# Patient Record
Sex: MALE | URBAN - METROPOLITAN AREA
[De-identification: names, ages, dates, MRNs, and addresses within clinical notes are randomized per-mention and may not be internally consistent; named-entity substitution may affect disease eponyms.]

---

## 2017-01-16 ENCOUNTER — APPOINTMENT (OUTPATIENT)
Age: 57
Setting detail: DERMATOLOGY
End: 2017-01-19

## 2017-01-16 DIAGNOSIS — L82.1 OTHER SEBORRHEIC KERATOSIS: ICD-10-CM

## 2017-01-16 DIAGNOSIS — D22 MELANOCYTIC NEVI: ICD-10-CM

## 2017-01-16 DIAGNOSIS — Z71.89 OTHER SPECIFIED COUNSELING: ICD-10-CM

## 2017-01-16 DIAGNOSIS — D18.0 HEMANGIOMA: ICD-10-CM

## 2017-01-16 DIAGNOSIS — D485 NEOPLASM OF UNCERTAIN BEHAVIOR OF SKIN: ICD-10-CM

## 2017-01-16 PROBLEM — D48.5 NEOPLASM OF UNCERTAIN BEHAVIOR OF SKIN: Status: ACTIVE | Noted: 2017-01-16

## 2017-01-16 PROBLEM — D18.01 HEMANGIOMA OF SKIN AND SUBCUTANEOUS TISSUE: Status: ACTIVE | Noted: 2017-01-16

## 2017-01-16 PROCEDURE — OTHER BIOPSY BY SHAVE METHOD: OTHER

## 2017-01-16 PROCEDURE — OTHER COUNSELING: OTHER

## 2017-01-16 PROCEDURE — OTHER SUNSCREEN RECOMMENDATIONS: OTHER

## 2017-01-16 PROCEDURE — 11100: CPT

## 2017-01-16 PROCEDURE — 99203 OFFICE O/P NEW LOW 30 MIN: CPT | Mod: 25

## 2017-01-16 ASSESSMENT — LOCATION SIMPLE DESCRIPTION DERM
LOCATION SIMPLE: RIGHT ANTERIOR NECK
LOCATION SIMPLE: LEFT SHOULDER
LOCATION SIMPLE: RIGHT LOWER BACK
LOCATION SIMPLE: RIGHT THIGH
LOCATION SIMPLE: RIGHT UPPER BACK
LOCATION SIMPLE: POSTERIOR NECK
LOCATION SIMPLE: ABDOMEN
LOCATION SIMPLE: ABDOMEN
LOCATION SIMPLE: RIGHT FOREARM
LOCATION SIMPLE: CHEST

## 2017-01-16 ASSESSMENT — LOCATION DETAILED DESCRIPTION DERM
LOCATION DETAILED: RIGHT SUPERIOR UPPER BACK
LOCATION DETAILED: RIGHT SUPERIOR MEDIAL MIDBACK
LOCATION DETAILED: PERIUMBILICAL SKIN
LOCATION DETAILED: RIGHT VENTRAL PROXIMAL FOREARM
LOCATION DETAILED: EPIGASTRIC SKIN
LOCATION DETAILED: LEFT POSTERIOR SHOULDER
LOCATION DETAILED: LEFT MEDIAL SUPERIOR CHEST
LOCATION DETAILED: RIGHT CLAVICULAR NECK
LOCATION DETAILED: LEFT MEDIAL TRAPEZIAL NECK
LOCATION DETAILED: RIGHT MID-UPPER BACK
LOCATION DETAILED: LEFT ANTERIOR SHOULDER
LOCATION DETAILED: RIGHT ANTERIOR PROXIMAL THIGH

## 2017-01-16 ASSESSMENT — LOCATION ZONE DERM
LOCATION ZONE: TRUNK
LOCATION ZONE: ARM
LOCATION ZONE: LEG
LOCATION ZONE: NECK
LOCATION ZONE: TRUNK

## 2017-01-16 NOTE — PROCEDURE: SUNSCREEN RECOMMENDATIONS
Detail Level: Detailed
Products Recommended: ABCs of melanoma discussed.
General Sunscreen Counseling: I recommended a broad spectrum sunscreen with a SPF of 30 or higher.  I explained that SPF 30 sunscreens block approximately 97 percent of the sun's harmful rays.  Sunscreens should be applied at least 15 minutes prior to expected sun exposure and then every 2 hours after that as long as sun exposure continues. If swimming or exercising sunscreen should be reapplied every 45 minutes to an hour after getting wet or sweating.  One ounce, or the equivalent of a shot glass full of sunscreen, is adequate to protect the skin not covered by a bathing suit. I also recommended a lip balm with a sunscreen as well. Sun protective clothing can be used in lieu of sunscreen but must be worn the entire time you are exposed to the sun's rays.

## 2017-01-16 NOTE — PROCEDURE: BIOPSY BY SHAVE METHOD
Detail Level: Simple
Hemostasis: Aluminum Chloride
Electrodesiccation And Curettage Text: The wound bed was treated with electrodesiccation and curettage after the biopsy was performed.
Anesthesia Type: 1% lidocaine without epinephrine
Wound Care: Vaseline
Post-Care Instructions: I reviewed with the patient in detail post-care instructions. Patient is to keep the biopsy site dry overnight, and then apply bacitracin twice daily until healed. Patient may apply hydrogen peroxide soaks to remove any crusting.
Cryotherapy Text: The wound bed was treated with cryotherapy after the biopsy was performed.
Anesthesia Volume In Cc (Will Not Render If 0): 2
Render Post-Care Instructions In Note?: no
Size Of Lesion In Cm: 0
Notification Instructions: Patient will be notified of biopsy results. However, patient instructed to call the office if not contacted within 2 weeks.
Type Of Destruction Used: Cryotherapy
Dressing: bandage
Curettage Text: The wound bed was treated with curettage after the biopsy was performed.
Biopsy Type: H and E
Silver Nitrate Text: The wound bed was treated with silver nitrate after the biopsy was performed.
Consent: Written consent was obtained and risks were reviewed including but not limited to scarring, infection, bleeding, scabbing, incomplete removal, nerve damage and allergy to anesthesia.
Electrodesiccation Text: The wound bed was treated with electrodesiccation after the biopsy was performed.
Destruction After The Procedure: Yes
Biopsy Method: Dermablade
Billing Type: Third-Party Bill

## 2017-07-17 ENCOUNTER — HOSPITAL ENCOUNTER (OUTPATIENT)
Dept: LAB | Age: 57
Discharge: HOME | End: 2017-07-17
Payer: COMMERCIAL

## 2017-07-17 ENCOUNTER — OFFICE VISIT (OUTPATIENT)
Dept: FAMILY MEDICINE CLINIC | Age: 57
End: 2017-07-17

## 2017-07-17 VITALS
WEIGHT: 205 LBS | DIASTOLIC BLOOD PRESSURE: 74 MMHG | RESPIRATION RATE: 18 BRPM | HEIGHT: 72 IN | OXYGEN SATURATION: 97 % | SYSTOLIC BLOOD PRESSURE: 129 MMHG | TEMPERATURE: 97.2 F | HEART RATE: 47 BPM | BODY MASS INDEX: 27.77 KG/M2

## 2017-07-17 DIAGNOSIS — M25.50 ARTHRALGIA, UNSPECIFIED JOINT: ICD-10-CM

## 2017-07-17 DIAGNOSIS — K44.9 HIATAL HERNIA: ICD-10-CM

## 2017-07-17 DIAGNOSIS — M54.2 NECK PAIN: Primary | ICD-10-CM

## 2017-07-17 DIAGNOSIS — I10 ESSENTIAL HYPERTENSION: ICD-10-CM

## 2017-07-17 DIAGNOSIS — R13.10 DYSPHAGIA, UNSPECIFIED TYPE: ICD-10-CM

## 2017-07-17 DIAGNOSIS — Z12.5 PROSTATE CANCER SCREENING: ICD-10-CM

## 2017-07-17 DIAGNOSIS — G89.29 CHRONIC RIGHT-SIDED THORACIC BACK PAIN: ICD-10-CM

## 2017-07-17 DIAGNOSIS — M54.6 CHRONIC RIGHT-SIDED THORACIC BACK PAIN: ICD-10-CM

## 2017-07-17 LAB
ALBUMIN SERPL BCP-MCNC: 4.3 G/DL (ref 3.4–5)
ALBUMIN/GLOB SERPL: 1.6 {RATIO} (ref 0.8–1.7)
ALP SERPL-CCNC: 58 U/L (ref 45–117)
ALT SERPL-CCNC: 38 U/L (ref 16–61)
ANION GAP BLD CALC-SCNC: 8 MMOL/L (ref 3–18)
AST SERPL W P-5'-P-CCNC: 17 U/L (ref 15–37)
BASOPHILS # BLD AUTO: 0 K/UL (ref 0–0.06)
BASOPHILS # BLD: 0 % (ref 0–2)
BILIRUB SERPL-MCNC: 0.7 MG/DL (ref 0.2–1)
BUN SERPL-MCNC: 16 MG/DL (ref 7–18)
BUN/CREAT SERPL: 16 (ref 12–20)
CALCIUM SERPL-MCNC: 8.8 MG/DL (ref 8.5–10.1)
CHLORIDE SERPL-SCNC: 105 MMOL/L (ref 100–108)
CHOLEST SERPL-MCNC: 143 MG/DL
CK SERPL-CCNC: 167 U/L (ref 39–308)
CO2 SERPL-SCNC: 28 MMOL/L (ref 21–32)
CREAT SERPL-MCNC: 1.01 MG/DL (ref 0.6–1.3)
DIFFERENTIAL METHOD BLD: NORMAL
EOSINOPHIL # BLD: 0.2 K/UL (ref 0–0.4)
EOSINOPHIL NFR BLD: 3 % (ref 0–5)
ERYTHROCYTE [DISTWIDTH] IN BLOOD BY AUTOMATED COUNT: 13 % (ref 11.6–14.5)
ERYTHROCYTE [SEDIMENTATION RATE] IN BLOOD: 1 MM/HR (ref 0–20)
GLOBULIN SER CALC-MCNC: 2.7 G/DL (ref 2–4)
GLUCOSE SERPL-MCNC: 103 MG/DL (ref 74–99)
HCT VFR BLD AUTO: 44.7 % (ref 36–48)
HDLC SERPL-MCNC: 57 MG/DL (ref 40–60)
HDLC SERPL: 2.5 {RATIO} (ref 0–5)
HGB BLD-MCNC: 15 G/DL (ref 13–16)
LDLC SERPL CALC-MCNC: 69 MG/DL (ref 0–100)
LIPID PROFILE,FLP: NORMAL
LYMPHOCYTES # BLD AUTO: 34 % (ref 21–52)
LYMPHOCYTES # BLD: 2.2 K/UL (ref 0.9–3.6)
MCH RBC QN AUTO: 30 PG (ref 24–34)
MCHC RBC AUTO-ENTMCNC: 33.6 G/DL (ref 31–37)
MCV RBC AUTO: 89.4 FL (ref 74–97)
MONOCYTES # BLD: 0.4 K/UL (ref 0.05–1.2)
MONOCYTES NFR BLD AUTO: 7 % (ref 3–10)
NEUTS SEG # BLD: 3.6 K/UL (ref 1.8–8)
NEUTS SEG NFR BLD AUTO: 56 % (ref 40–73)
PLATELET # BLD AUTO: 188 K/UL (ref 135–420)
PMV BLD AUTO: 11.5 FL (ref 9.2–11.8)
POTASSIUM SERPL-SCNC: 4.1 MMOL/L (ref 3.5–5.5)
PROT SERPL-MCNC: 7 G/DL (ref 6.4–8.2)
PSA SERPL-MCNC: 1.1 NG/ML (ref 0–4)
RBC # BLD AUTO: 5 M/UL (ref 4.7–5.5)
SODIUM SERPL-SCNC: 141 MMOL/L (ref 136–145)
TRIGL SERPL-MCNC: 85 MG/DL (ref ?–150)
VLDLC SERPL CALC-MCNC: 17 MG/DL
WBC # BLD AUTO: 6.5 K/UL (ref 4.6–13.2)

## 2017-07-17 PROCEDURE — 85652 RBC SED RATE AUTOMATED: CPT | Performed by: PHYSICIAN ASSISTANT

## 2017-07-17 PROCEDURE — 84153 ASSAY OF PSA TOTAL: CPT | Performed by: PHYSICIAN ASSISTANT

## 2017-07-17 PROCEDURE — 82550 ASSAY OF CK (CPK): CPT | Performed by: PHYSICIAN ASSISTANT

## 2017-07-17 PROCEDURE — 36415 COLL VENOUS BLD VENIPUNCTURE: CPT | Performed by: PHYSICIAN ASSISTANT

## 2017-07-17 PROCEDURE — 80053 COMPREHEN METABOLIC PANEL: CPT | Performed by: PHYSICIAN ASSISTANT

## 2017-07-17 PROCEDURE — 86430 RHEUMATOID FACTOR TEST QUAL: CPT | Performed by: PHYSICIAN ASSISTANT

## 2017-07-17 PROCEDURE — 85025 COMPLETE CBC W/AUTO DIFF WBC: CPT | Performed by: PHYSICIAN ASSISTANT

## 2017-07-17 PROCEDURE — 80061 LIPID PANEL: CPT | Performed by: PHYSICIAN ASSISTANT

## 2017-07-17 RX ORDER — BISMUTH SUBSALICYLATE 262 MG
1 TABLET,CHEWABLE ORAL DAILY
COMMUNITY

## 2017-07-17 RX ORDER — ESOMEPRAZOLE MAGNESIUM 40 MG/1
CAPSULE, DELAYED RELEASE ORAL DAILY
COMMUNITY
End: 2017-11-20 | Stop reason: SDUPTHER

## 2017-07-17 RX ORDER — OXYBUTYNIN CHLORIDE 5 MG/1
5 TABLET ORAL 3 TIMES DAILY
COMMUNITY
End: 2017-09-18 | Stop reason: SDUPTHER

## 2017-07-17 RX ORDER — ASPIRIN 81 MG/1
81 TABLET ORAL DAILY
COMMUNITY

## 2017-07-17 RX ORDER — AMLODIPINE BESYLATE 5 MG/1
5 TABLET ORAL DAILY
COMMUNITY
End: 2017-09-15 | Stop reason: SDUPTHER

## 2017-07-17 RX ORDER — RANITIDINE 150 MG/1
150 TABLET, FILM COATED ORAL
COMMUNITY
End: 2020-07-29

## 2017-07-17 RX ORDER — PRAVASTATIN SODIUM 20 MG/1
20 TABLET ORAL
COMMUNITY
End: 2017-09-15 | Stop reason: SDUPTHER

## 2017-07-17 RX ORDER — DIAPER,BRIEF,ADULT, DISPOSABLE
500 EACH MISCELLANEOUS DAILY
COMMUNITY

## 2017-07-17 RX ORDER — METOPROLOL SUCCINATE 100 MG/1
TABLET, EXTENDED RELEASE ORAL DAILY
COMMUNITY
End: 2017-09-15 | Stop reason: SDUPTHER

## 2017-07-17 NOTE — MR AVS SNAPSHOT
Visit Information Date & Time Provider Department Dept. Phone Encounter #  
 7/17/2017  1:00 PM Venkata Reyna, [address] [facility] [phone] [id number] Upcoming Health Maintenance Date Due Hepatitis C Screening 1960 FOBT Q 1 YEAR AGE 50-75 3/29/2018* INFLUENZA AGE 9 TO ADULT 8/1/2017 DTaP/Tdap/Td series (2 - Td) 7/17/2027 *Topic was postponed. The date shown is not the original due date. Allergies as of 7/17/2017  Review Complete On: 7/17/2017 By: Asher Lantigua LPN Severity Noted Reaction Type Reactions Doxycycline High 07/17/2017    Anaphylaxis Keflex [Cephalexin] High 07/17/2017    Anaphylaxis Pcn [Penicillins]  07/17/2017    Hives Current Immunizations  Never Reviewed No immunizations on file. Not reviewed this visit You Were Diagnosed With   
  
 Codes Comments Neck pain    -  Primary ICD-10-CM: M54.2 ICD-9-CM: 723.1 Chronic right-sided thoracic back pain     ICD-10-CM: M54.6, G89.29 ICD-9-CM: 724.1, 338.29 Hiatal hernia     ICD-10-CM: K44.9 ICD-9-CM: 113. 3 Dysphagia, unspecified type     ICD-10-CM: R13.10 ICD-9-CM: 787.20 Essential hypertension     ICD-10-CM: I10 
ICD-9-CM: 401.9 Prostate cancer screening     ICD-10-CM: Z12.5 ICD-9-CM: V76.44 Arthralgia, unspecified joint     ICD-10-CM: M25.50 ICD-9-CM: 719.40 Vitals BP Pulse Temp Resp Height(growth percentile) Weight(growth percentile) 129/74 (BP 1 Location: Right arm, BP Patient Position: Sitting) (!) 47 97.2 °F (36.2 °C) (Oral) 18 6' (1.829 m) 205 lb (93 kg) SpO2 BMI Smoking Status 97% 27.8 kg/m2 Never Smoker Vitals History BMI and BSA Data Body Mass Index Body Surface Area  
 27.8 kg/m 2 2.17 m 2 Your Updated Medication List  
  
   
This list is accurate as of: 7/17/17  3:49 PM.  Always use your most recent med list.  
  
  
  
  
 ALIGN 4 mg Cap Generic drug:  Bifidobacterium Infantis Take  by mouth. aspirin delayed-release 81 mg tablet Take  by mouth daily. CITRUCEL 500 mg tablet Generic drug:  methylcellulose Take 1,000 mg by mouth daily. esomeprazole 40 mg capsule Commonly known as:  Deanna Martinez Take  by mouth daily. L-LYSINE 500 mg Tab tablet Generic drug:  lysine Take  by mouth three (3) times daily (with meals). MEN'S MULTI-VITAMIN tablet Generic drug:  multivitamin Take 1 Tab by mouth daily. metoprolol succinate 100 mg tablet Commonly known as:  TOPROL-XL Take  by mouth daily. NORVASC 5 mg tablet Generic drug:  amLODIPine Take 5 mg by mouth daily. oxybutynin 5 mg tablet Commonly known as:  TROXOQQX Take 5 mg by mouth three (3) times daily. pravastatin 20 mg tablet Commonly known as:  PRAVACHOL Take 20 mg by mouth nightly. ZANTAC 150 mg tablet Generic drug:  raNITIdine Take 150 mg by mouth two (2) times a day. We Performed the Following REFERRAL TO GASTROENTEROLOGY [RID54 Custom] Comments:  
 Please evaluate patient for hiatal hernia/dysphagia. To-Do List   
 07/17/2017 Imaging:  XR SPINE CERV PA LAT ODONT 3 V MAX   
  
 07/17/2017 Imaging:  XR SPINE THORAC 2 V Referral Information Referral ID Referred By Referred To  
  
 [id number] Jax MCUHGH Not Available Visits Status Start Date End Date 1 New Request 7/17/17 7/17/18 If your referral has a status of pending review or denied, additional information will be sent to support the outcome of this decision. Introducing [facility]! [employer] introduces [employer] patient portal. Now you can access parts of your medical record, email your doctor's office, and request medication refills online. 1. In your internet browser, go to [email or web address] 2. Click on the First Time User? Click Here link in the Sign In box. You will see the New Member Sign Up page. 3. Enter your [employer] Access Code exactly as it appears below. You will not need to use this code after youve completed the sign-up process. If you do not sign up before the expiration date, you must request a new code. · [employer] Access Code: [email or web address] Expires: 10/15/2017  3:49 PM 
 
4. Enter the last four digits of your Social Security Number (xxxx) and Date of Birth (mm/dd/yyyy) as indicated and click Submit. You will be taken to the next sign-up page. 5. Create a [employer] ID. This will be your [employer] login ID and cannot be changed, so think of one that is secure and easy to remember. 6. Create a [employer] password. You can change your password at any time. 7. Enter your Password Reset Question and Answer. This can be used at a later time if you forget your password. 8. Enter your e-mail address. You will receive e-mail notification when new information is available in [address] 9. Click Sign Up. You can now view and download portions of your medical record. 10. Click the Download Summary menu link to download a portable copy of your medical information. If you have questions, please visit the Frequently Asked Questions section of the [employer] website. Remember, [employer] is NOT to be used for urgent needs. For medical emergencies, dial 911. Now available from your iPhone and Android! Please provide this summary of care documentation to your next provider. Your primary care clinician is listed as Merline Hurt. If you have any questions after today's visit, please call [phone].

## 2017-07-18 LAB — RHEUMATOID FACT SER QL LA: NEGATIVE

## 2017-07-24 NOTE — PROGRESS NOTES
HISTORY OF PRESENT ILLNESS  Jose Soni is a 61 y.o. male. HPI   Pt is here to establish care. He has a hx of right sided back pain and more recently neck pain. He has no hx of trauma but has not had xrays. He recently was dx with a hiatal hernia and was wondering if he needed to see anyone for it. He also recently has had trouble swallowing. Advised pt this could be related to his hernia. Lastly pat has joint pain at mult sites and would like labs to evaluate that. Allergies   Allergen Reactions    Doxycycline Anaphylaxis    Keflex [Cephalexin] Anaphylaxis    Pcn [Penicillins] Hives       Current Outpatient Prescriptions   Medication Sig    amLODIPine (NORVASC) 5 mg tablet Take 5 mg by mouth daily.  metoprolol succinate (TOPROL-XL) 100 mg tablet Take  by mouth daily.  raNITIdine (ZANTAC) 150 mg tablet Take 150 mg by mouth two (2) times a day.  aspirin delayed-release 81 mg tablet Take  by mouth daily.  esomeprazole (NEXIUM) 40 mg capsule Take  by mouth daily.  pravastatin (PRAVACHOL) 20 mg tablet Take 20 mg by mouth nightly.  oxybutynin (DITROPAN) 5 mg tablet Take 5 mg by mouth three (3) times daily.  lysine (L-LYSINE) 500 mg tab tablet Take  by mouth three (3) times daily (with meals).  Bifidobacterium Infantis (ALIGN) 4 mg cap Take  by mouth.  methylcellulose (CITRUCEL) 500 mg tablet Take 1,000 mg by mouth daily.  multivitamin (MEN'S MULTI-VITAMIN) tablet Take 1 Tab by mouth daily. No current facility-administered medications for this visit. Review of Systems   Constitutional: Negative. Negative for chills, fever and malaise/fatigue. HENT: Negative. Negative for congestion and ear pain. Respiratory: Negative. Negative for cough, shortness of breath and wheezing. Cardiovascular: Negative. Negative for chest pain, palpitations and leg swelling. Gastrointestinal: Positive for abdominal pain (hiatal hernia) and heartburn.  Negative for nausea and vomiting. Dysphagia    Genitourinary: Negative. Musculoskeletal: Positive for back pain (chronic), joint pain (generalized) and neck pain (chronic). Skin: Negative. Negative for itching and rash. Neurological: Negative. Negative for dizziness, tingling, tremors and headaches. Psychiatric/Behavioral: Negative. Negative for depression. The patient is not nervous/anxious and does not have insomnia. Visit Vitals    /74 (BP 1 Location: Right arm, BP Patient Position: Sitting)    Pulse (!) 47    Temp 97.2 °F (36.2 °C) (Oral)    Resp 18    Ht 6' (1.829 m)    Wt 205 lb (93 kg)    SpO2 97%    BMI 27.8 kg/m2       Physical Exam   Constitutional: He is oriented to person, place, and time. No distress. HENT:   Head: Normocephalic and atraumatic. Right Ear: Tympanic membrane, external ear and ear canal normal.   Left Ear: Tympanic membrane, external ear and ear canal normal.   Nose: Nose normal. Right sinus exhibits no maxillary sinus tenderness and no frontal sinus tenderness. Left sinus exhibits no maxillary sinus tenderness and no frontal sinus tenderness. Mouth/Throat: Uvula is midline, oropharynx is clear and moist and mucous membranes are normal.   Cardiovascular: Normal rate, regular rhythm and normal heart sounds. Exam reveals no gallop and no friction rub. No murmur heard. Pulmonary/Chest: Effort normal and breath sounds normal. No respiratory distress. He has no wheezes. He has no rales. Abdominal: There is tenderness (discomfort in epigastric area). Musculoskeletal:        Cervical back: He exhibits tenderness. He exhibits normal range of motion, no bony tenderness, no edema, no deformity and no spasm. Thoracic back: He exhibits tenderness (right side). Neurological: He is alert and oriented to person, place, and time. Skin: Skin is warm and dry. He is not diaphoretic. Psychiatric: He has a normal mood and affect.  His behavior is normal. ASSESSMENT and PLAN    ICD-10-CM ICD-9-CM    1. Neck pain M54.2 723.1 XR SPINE CERV PA LAT ODONT 3 V MAX   2. Chronic right-sided thoracic back pain M54.6 724.1 XR SPINE THORAC 2 V    G89.29 338.29    3. Hiatal hernia K44.9 553.3 REFERRAL TO GASTROENTEROLOGY   4. Dysphagia, unspecified type R13.10 787.20 REFERRAL TO GASTROENTEROLOGY      CBC WITH AUTOMATED DIFF   5. Essential hypertension F76 716.5 METABOLIC PANEL, COMPREHENSIVE      LIPID PANEL      CBC WITH AUTOMATED DIFF   6. Prostate cancer screening Z12.5 V76.44 PSA SCREENING (SCREENING)   7. Arthralgia, unspecified joint M25.50 719.40 SED RATE (ESR)      CK      RHEUMATOID FACTOR, QL     Follow-up Disposition:  Return in about 4 weeks (around 8/14/2017) for follow up. Pt expressed understanding of visit summary and plans for any follow ups or referrals as well as any medications prescribed.

## 2017-07-24 NOTE — PATIENT INSTRUCTIONS

## 2017-09-15 ENCOUNTER — OFFICE VISIT (OUTPATIENT)
Dept: FAMILY MEDICINE CLINIC | Age: 57
End: 2017-09-15

## 2017-09-15 VITALS
SYSTOLIC BLOOD PRESSURE: 113 MMHG | BODY MASS INDEX: 27.63 KG/M2 | DIASTOLIC BLOOD PRESSURE: 63 MMHG | TEMPERATURE: 97 F | WEIGHT: 204 LBS | HEART RATE: 51 BPM | RESPIRATION RATE: 16 BRPM | OXYGEN SATURATION: 98 % | HEIGHT: 72 IN

## 2017-09-15 DIAGNOSIS — I10 ESSENTIAL HYPERTENSION: ICD-10-CM

## 2017-09-15 DIAGNOSIS — Z23 ENCOUNTER FOR IMMUNIZATION: ICD-10-CM

## 2017-09-15 DIAGNOSIS — E78.01 FAMILIAL HYPERCHOLESTEROLEMIA: ICD-10-CM

## 2017-09-15 DIAGNOSIS — Z00.00 ANNUAL PHYSICAL EXAM: Primary | ICD-10-CM

## 2017-09-15 PROBLEM — K21.9 GASTROESOPHAGEAL REFLUX DISEASE WITHOUT ESOPHAGITIS: Status: ACTIVE | Noted: 2017-09-15

## 2017-09-15 PROBLEM — E78.5 HYPERLIPIDEMIA: Status: ACTIVE | Noted: 2017-09-15

## 2017-09-15 RX ORDER — AMLODIPINE BESYLATE 5 MG/1
5 TABLET ORAL DAILY
Qty: 90 TAB | Refills: 0 | Status: SHIPPED | OUTPATIENT
Start: 2017-09-15 | End: 2017-12-14

## 2017-09-15 RX ORDER — METOPROLOL SUCCINATE 100 MG/1
100 TABLET, EXTENDED RELEASE ORAL DAILY
Qty: 90 TAB | Refills: 0 | Status: SHIPPED | OUTPATIENT
Start: 2017-09-15 | End: 2017-12-14

## 2017-09-15 RX ORDER — PRAVASTATIN SODIUM 20 MG/1
20 TABLET ORAL
Qty: 90 TAB | Refills: 0 | Status: SHIPPED | OUTPATIENT
Start: 2017-09-15 | End: 2017-12-14

## 2017-09-15 NOTE — PROGRESS NOTES
HISTORY OF PRESENT ILLNESS  Shanita Botello is a 61 y.o. male who presents with a history of HTN and hyperlipidemia. He is currently on Norvasc and Toprol for HTN and on Pravastatin for hyperlipidemia. He denies any side effectes of the medicatoins and states he takes them daily as directed. Hypertension    The history is provided by the patient. This is a chronic problem. Pertinent negatives include no chest pain, no palpitations, no malaise/fatigue, no blurred vision, no headaches, no dizziness, no shortness of breath and no nausea. There are no associated agents to hypertension. Risk factors include no risk factors. Cholesterol Problem   The history is provided by the patient. This is a chronic problem. Pertinent negatives include no chest pain, no abdominal pain, no headaches and no shortness of breath. The symptoms are relieved by medications. Visit Vitals    /63    Pulse (!) 51    Temp 97 °F (36.1 °C) (Oral)    Resp 16    Ht 6' 0.01\" (1.829 m)    Wt 204 lb (92.5 kg)    SpO2 98%    BMI 27.66 kg/m2         Past Medical History:   Diagnosis Date    Chronic pain     Hypercholesterolemia     Hypertension     IBS (irritable bowel syndrome)      Allergies   Allergen Reactions    Doxycycline Anaphylaxis    Keflex [Cephalexin] Anaphylaxis    Pcn [Penicillins] Hives     Current Outpatient Prescriptions   Medication Sig    amLODIPine (NORVASC) 5 mg tablet Take 1 Tab by mouth daily for 90 days.  metoprolol succinate (TOPROL-XL) 100 mg tablet Take 1 Tab by mouth daily for 90 days.  pravastatin (PRAVACHOL) 20 mg tablet Take 1 Tab by mouth nightly for 90 days.  raNITIdine (ZANTAC) 150 mg tablet Take 150 mg by mouth two (2) times a day.  aspirin delayed-release 81 mg tablet Take  by mouth daily.  esomeprazole (NEXIUM) 40 mg capsule Take  by mouth daily.  lysine (L-LYSINE) 500 mg tab tablet Take  by mouth three (3) times daily (with meals).     Bifidobacterium Infantis (ALIGN) 4 mg cap Take  by mouth.  methylcellulose (CITRUCEL) 500 mg tablet Take 1,000 mg by mouth daily.  multivitamin (MEN'S MULTI-VITAMIN) tablet Take 1 Tab by mouth daily.  oxybutynin (DITROPAN) 5 mg tablet Take 1 Tab by mouth three (3) times daily. No current facility-administered medications for this visit. Review of Systems   Constitutional: Negative for diaphoresis and malaise/fatigue. Eyes: Negative for blurred vision and photophobia. Respiratory: Negative for shortness of breath. Cardiovascular: Negative for chest pain, palpitations and leg swelling. Gastrointestinal: Negative for abdominal pain and nausea. Musculoskeletal: Negative for myalgias. Neurological: Negative for dizziness, sensory change and headaches. Physical Exam   Constitutional: He is oriented to person, place, and time. He appears well-developed and well-nourished. No distress. Eyes: EOM are normal. Pupils are equal, round, and reactive to light. Neck: Neck supple. No JVD present. Cardiovascular: Normal rate, regular rhythm and intact distal pulses. No murmur heard. Pulmonary/Chest: Effort normal and breath sounds normal.   Musculoskeletal: He exhibits no edema. Lymphadenopathy:     He has no cervical adenopathy. Neurological: He is alert and oriented to person, place, and time. ASSESSMENT and PLAN    ICD-10-CM ICD-9-CM    1. Annual physical exam Z00.00 V70.0    2. Essential hypertension I10 401.9 amLODIPine (NORVASC) 5 mg tablet      metoprolol succinate (TOPROL-XL) 100 mg tablet   3. Familial hypercholesterolemia E78.01 272.0 pravastatin (PRAVACHOL) 20 mg tablet   4. Encounter for immunization Z23 V03.89 INFLUENZA VIRUS VAC QUAD,SPLIT,PRESV FREE SYRINGE IM      LA IMMUNIZ ADMIN,1 SINGLE/COMB VAC/TOXOID     Patient given medication refills. Labs were done almost three months ago so we will do  Labs again on his three month follow up.   He verbalizes understanding and agrees with plan.

## 2017-09-15 NOTE — PROGRESS NOTES
Amy Tucker is a 61 y.o. male presents in office to be seen for physical and flu shot. Health Maintenance Due   Topic Date Due    Hepatitis C Screening  1960    INFLUENZA AGE 9 TO ADULT  08/01/2017       1. Have you been to the ER, urgent care clinic since your last visit? Hospitalized since your last visit?no    2. Have you seen or consulted any other health care providers outside of the [facility] since your last visit? Include any pap smears or colon screening.  no

## 2017-09-18 NOTE — TELEPHONE ENCOUNTER
Pt's wife  calling to request medication refill of:    Requested Prescriptions     Pending Prescriptions Disp Refills    oxybutynin (DITROPAN) 5 mg tablet 270 Tab 0     Sig: Take 1 Tab by mouth three (3) times daily. be sent to [facility]. Pt has about 9 tabs remaining = 3 days. Pts last appt was 09/15/17 with Ms. Alvaro Busch. Advised pt of 72 hour time frame for refill requests. Please advise.

## 2017-09-25 RX ORDER — OXYBUTYNIN CHLORIDE 5 MG/1
5 TABLET ORAL 3 TIMES DAILY
Qty: 270 TAB | Refills: 0 | Status: SHIPPED | OUTPATIENT
Start: 2017-09-25 | End: 2017-11-27 | Stop reason: ALTCHOICE

## 2017-10-04 NOTE — PROGRESS NOTES
History of Present Illness  Patient is a 61 y.o. male who presents for an annual physical.  He takes medications daily for HTN, hyperlipidemia, GERD and over active bladder. He has had no recent illnesses and has no complaints. His last complete physical was at least a year ago.        Review of Systems   Ophthalmic ROS: negative for - blurry vision or photophobia  ENT ROS: negative for - epistaxis, headaches or vertigo  Endocrine ROS: negative for - palpitations, polydipsia/polyuria, skin changes or temperature intolerance  Cardiovascular ROS: no chest pain or dyspnea on exertion  Gastrointestinal ROS: no abdominal pain, change in bowel habits, or black or bloody stools  Genito-Urinary ROS: no dysuria, trouble voiding, or hematuria  Musculoskeletal ROS: negative for - joint pain or muscular weakness  Neurological ROS: no TIA or stroke symptoms  Dermatological ROS: negative for - pruritus or rash    Physical Examination   General appearance - alert, well appearing, and in no distress  Mental status - alert, oriented to person, place, and time  Eyes - pupils equal and reactive, extraocular eye movements intact  Ears - bilateral TM's and external ear canals normal  Nose - normal and patent, no erythema, discharge or polyps  Mouth - mucous membranes moist, pharynx normal without lesions  Neck - supple, no significant adenopathy  Chest - clear to auscultation, no wheezes, rales or rhonchi, symmetric air entry  Heart - normal rate, regular rhythm, normal S1, S2, no murmurs, rubs, clicks or gallops  Abdomen - soft, nontender, nondistended, no masses or organomegaly  Neurological - alert, oriented, normal speech, no focal findings or movement disorder noted  Musculoskeletal - no joint tenderness, deformity or swelling  Extremities - peripheral pulses normal, no pedal edema, no clubbing or cyanosis  Skin - normal coloration and turgor, no rashes, no suspicious skin lesions noted      Visit Vitals    /63    Pulse (!) 51    Temp 97 °F (36.1 °C) (Oral)    Resp 16    Ht 6' 0.01\" (1.829 m)    Wt 204 lb (92.5 kg)    SpO2 98%    BMI 27.66 kg/m2       Hospital Outpatient Visit on 07/17/2017   Component Date Value Ref Range Status    Sodium 07/17/2017 141  136 - 145 mmol/L Final    Potassium 07/17/2017 4.1  3.5 - 5.5 mmol/L Final    Chloride 07/17/2017 105  100 - 108 mmol/L Final    CO2 07/17/2017 28  21 - 32 mmol/L Final    Anion gap 07/17/2017 8  3.0 - 18 mmol/L Final    Glucose 07/17/2017 103* 74 - 99 mg/dL Final    BUN 07/17/2017 16  7.0 - 18 MG/DL Final    Creatinine 07/17/2017 1.01  0.6 - 1.3 MG/DL Final    BUN/Creatinine ratio 07/17/2017 16  12 - 20   Final    GFR est AA 07/17/2017 >60  >60 ml/min/1.73m2 Final    GFR est non-AA 07/17/2017 >60  >60 ml/min/1.73m2 Final    Comment: (NOTE)  Estimated GFR is calculated using the Modification of Diet in Renal   Disease (MDRD) Study equation, reported for both  Americans   (GFRAA) and non- Americans (GFRNA), and normalized to 1.73m2   body surface area. The physician must decide which value applies to   the patient. The MDRD study equation should only be used in   individuals age 25 or older. It has not been validated for the   following: pregnant women, patients with serious comorbid conditions,   or on certain medications, or persons with extremes of body size,   muscle mass, or nutritional status.  Calcium 07/17/2017 8.8  8.5 - 10.1 MG/DL Final    Bilirubin, total 07/17/2017 0.7  0.2 - 1.0 MG/DL Final    ALT (SGPT) 07/17/2017 38  16 - 61 U/L Final    AST (SGOT) 07/17/2017 17  15 - 37 U/L Final    Alk.  phosphatase 07/17/2017 58  45 - 117 U/L Final    Protein, total 07/17/2017 7.0  6.4 - 8.2 g/dL Final    Albumin 07/17/2017 4.3  3.4 - 5.0 g/dL Final    Globulin 07/17/2017 2.7  2.0 - 4.0 g/dL Final    A-G Ratio 07/17/2017 1.6  0.8 - 1.7   Final    LIPID PROFILE 07/17/2017        Final    Cholesterol, total 07/17/2017 143  <200 MG/DL Final    Triglyceride 07/17/2017 85  <150 MG/DL Final    Comment: The drugs N-acetylcysteine (NAC) and  Metamiszole have been found to cause falsely  low results in this chemical assay. Please  be sure to submit blood samples obtained  BEFORE administration of either of these  drugs to assure correct results.  HDL Cholesterol 07/17/2017 57  40 - 60 MG/DL Final    LDL, calculated 07/17/2017 69  0 - 100 MG/DL Final    VLDL, calculated 07/17/2017 17  MG/DL Final    CHOL/HDL Ratio 07/17/2017 2.5  0 - 5.0   Final    WBC 07/17/2017 6.5  4.6 - 13.2 K/uL Final    RBC 07/17/2017 5.00  4.70 - 5.50 M/uL Final    HGB 07/17/2017 15.0  13.0 - 16.0 g/dL Final    HCT 07/17/2017 44.7  36.0 - 48.0 % Final    MCV 07/17/2017 89.4  74.0 - 97.0 FL Final    MCH 07/17/2017 30.0  24.0 - 34.0 PG Final    MCHC 07/17/2017 33.6  31.0 - 37.0 g/dL Final    RDW 07/17/2017 13.0  11.6 - 14.5 % Final    PLATELET 95/84/4216 732  135 - 420 K/uL Final    MPV 07/17/2017 11.5  9.2 - 11.8 FL Final    NEUTROPHILS 07/17/2017 56  40 - 73 % Final    LYMPHOCYTES 07/17/2017 34  21 - 52 % Final    MONOCYTES 07/17/2017 7  3 - 10 % Final    EOSINOPHILS 07/17/2017 3  0 - 5 % Final    BASOPHILS 07/17/2017 0  0 - 2 % Final    ABS. NEUTROPHILS 07/17/2017 3.6  1.8 - 8.0 K/UL Final    ABS. LYMPHOCYTES 07/17/2017 2.2  0.9 - 3.6 K/UL Final    ABS. MONOCYTES 07/17/2017 0.4  0.05 - 1.2 K/UL Final    ABS. EOSINOPHILS 07/17/2017 0.2  0.0 - 0.4 K/UL Final    ABS. BASOPHILS 07/17/2017 0.0  0.0 - 0.06 K/UL Final    DF 07/17/2017 AUTOMATED    Final    Prostate Specific Ag 07/17/2017 1.1  0.0 - 4.0 ng/mL Final    Sed rate, automated 07/17/2017 1  0 - 20 mm/hr Final    CK 07/17/2017 167  39 - 308 U/L Final    Rheumatoid factor, QL 07/17/2017 NEGATIVE   NEG   Final        Assessment and Plan  Patient seems to be doing well and is decent health.   Patient is to return to center in one year for his annual exam.

## 2017-11-20 NOTE — TELEPHONE ENCOUNTER
Froilan, please see refill request for patient, thank you! Requested Prescriptions     Pending Prescriptions Disp Refills    esomeprazole (NEXIUM) 40 mg capsule 90 Cap 1     Sig: Take 1 Cap by mouth daily.

## 2017-11-20 NOTE — TELEPHONE ENCOUNTER
Pt calling to request medication refill of:    Requested Prescriptions     Pending Prescriptions Disp Refills    esomeprazole (NEXIUM) 40 mg capsule 90 Cap 1     Sig: Take 1 Cap by mouth daily. be sent to [facility]. Pt has about 0 tabs remaining. Pts last appt was 09/15/17. Advised pt of 72 hour time frame for refill requests. Please advise.

## 2017-11-21 RX ORDER — ESOMEPRAZOLE MAGNESIUM 40 MG/1
40 CAPSULE, DELAYED RELEASE ORAL DAILY
Qty: 90 CAP | Refills: 1 | Status: SHIPPED | OUTPATIENT
Start: 2017-11-21 | End: 2018-02-09 | Stop reason: SDUPTHER

## 2017-12-11 ENCOUNTER — OFFICE VISIT (OUTPATIENT)
Dept: FAMILY MEDICINE CLINIC | Age: 57
End: 2017-12-11

## 2017-12-11 VITALS
HEART RATE: 59 BPM | DIASTOLIC BLOOD PRESSURE: 68 MMHG | HEIGHT: 72 IN | BODY MASS INDEX: 28.17 KG/M2 | TEMPERATURE: 97 F | WEIGHT: 208 LBS | SYSTOLIC BLOOD PRESSURE: 146 MMHG | OXYGEN SATURATION: 96 % | RESPIRATION RATE: 16 BRPM

## 2017-12-11 DIAGNOSIS — D17.20 LIPOMA OF SHOULDER: Primary | ICD-10-CM

## 2017-12-11 NOTE — PATIENT INSTRUCTIONS
Lipoma: Care Instructions  Your Care Instructions  A lipoma is a growth of fat just below the skin. It may feel soft and rubbery. Lipomas can occur anywhere on the body. But they are most common on the torso, neck, upper thighs, upper arms, and armpits. A lipoma does not turn into cancer. Lipomas usually are not treated, because most of them don't hurt or cause problems. But your doctor may remove a lipoma if it is painful, gets infected, or bothers you. Follow-up care is a key part of your treatment and safety. Be sure to make and go to all appointments, and call your doctor if you are having problems. It's also a good idea to know your test results and keep a list of the medicines you take. How can you care for yourself at home? · Lipomas usually need no care at home. · If your doctor removes a lipoma, follow directions for taking care of the cut (incision). When should you call for help? Call your doctor now or seek immediate medical care if:  ? · You have signs of infection, such as:  ¨ Increased pain, swelling, warmth, or redness. ¨ Red streaks leading from the lipoma. ¨ Pus draining from the lipoma. ¨ A fever. ? Watch closely for changes in your health, and be sure to contact your doctor if:  ? · The lipoma is growing or changing. ? · You do not get better as expected. Where can you learn more? Go to http://tucker-marcelo.info/. Enter D263 in the search box to learn more about \"Lipoma: Care Instructions. \"  Current as of: October 13, 2016  Content Version: 11.4  © 7239-8795 Progeniq. Care instructions adapted under license by MobiApps (which disclaims liability or warranty for this information). If you have questions about a medical condition or this instruction, always ask your healthcare professional. [address] any warranty or liability for your use of this information.

## 2017-12-11 NOTE — MR AVS SNAPSHOT
Visit Information Date & Time Provider Department Dept. Phone Encounter #  
 12/11/2017  1:00 PM Karen Teresa, [address] [phone] [id number] Follow-up Instructions Return if symptoms worsen or fail to improve. Your Appointments 11/26/2018  9:30 AM  
ESTABLISHED PATIENT with Lexus Franco MD  
[facility] Appt Note: Return in about 1 year (around 11/27/2018). [address] [place] [address]  
[phone]  
  
   
 [facility] 72 [id number] Upcoming Health Maintenance Date Due Hepatitis C Screening 1960 FOBT Q 1 YEAR AGE 50-75 3/29/2018* DTaP/Tdap/Td series (2 - Td) 7/17/2027 *Topic was postponed. The date shown is not the original due date. Allergies as of 12/11/2017  Review Complete On: 12/11/2017 By: CIARA Babin Severity Noted Reaction Type Reactions Doxycycline High 07/17/2017    Anaphylaxis Keflex [Cephalexin] High 07/17/2017    Anaphylaxis Pcn [Penicillins]  07/17/2017    Hives Current Immunizations  Reviewed on 9/15/2017 Name Date Influenza Vaccine (Quad) PF 9/15/2017 Not reviewed this visit You Were Diagnosed With   
  
 Codes Comments Lipoma of shoulder    -  Primary ICD-10-CM: D17.20 ICD-9-CM: 214.1 Vitals BP Pulse Temp Resp Height(growth percentile) Weight(growth percentile) 146/68 (BP 1 Location: Right arm, BP Patient Position: Sitting) (!) 59 97 °F (36.1 °C) (Oral) 16 6' (1.829 m) 208 lb (94.3 kg) SpO2 BMI Smoking Status 96% 28.21 kg/m2 Never Smoker Vitals History BMI and BSA Data Body Mass Index Body Surface Area  
 28.21 kg/m 2 2.19 m 2 Preferred Pharmacy Pharmacy Name Phone [facility] [phone] Your Updated Medication List  
  
   
 This list is accurate as of: 12/11/17  2:19 PM.  Always use your most recent med list.  
  
  
  
  
 ALIGN 4 mg Cap Generic drug:  Bifidobacterium Infantis Take 4 mg by mouth daily. amLODIPine 5 mg tablet Commonly known as:  Simeon Saumel Take 1 Tab by mouth daily for 90 days. aspirin delayed-release 81 mg tablet Take 81 mg by mouth daily. CITRUCEL 500 mg tablet Generic drug:  methylcellulose Take 1,000 mg by mouth nightly. esomeprazole 40 mg capsule Commonly known as:  Delayne Crate Take 1 Cap by mouth daily. L-LYSINE 500 mg Tab tablet Generic drug:  lysine Take 500 mg by mouth daily. MEN'S MULTI-VITAMIN tablet Generic drug:  multivitamin Take 1 Tab by mouth daily. metoprolol succinate 100 mg tablet Commonly known as:  TOPROL-XL Take 1 Tab by mouth daily for 90 days. pravastatin 20 mg tablet Commonly known as:  PRAVACHOL Take 1 Tab by mouth nightly for 90 days. tolterodine ER 4 mg ER capsule Commonly known as:  Kanchan Lanius Take 1 Cap by mouth daily. ZANTAC 150 mg tablet Generic drug:  raNITIdine Take 150 mg by mouth nightly. We Performed the Following REFERRAL TO GENERAL SURGERY [REF27 Custom] Comments: Moderately large lipoma posterior left shoulder. Unsure how long it has been present. For about two weeks has had occasional tingling/pins and needles feeling of left lower arms. Follow-up Instructions Return if symptoms worsen or fail to improve. Referral Information Referral ID Referred By Referred To  
  
 [id number] Brayden Kline Not Available Visits Status Start Date End Date 1 New Request 12/11/17 12/11/18 If your referral has a status of pending review or denied, additional information will be sent to support the outcome of this decision. Patient Instructions Lipoma: Care Instructions Your Care Instructions A lipoma is a growth of fat just below the skin. It may feel soft and rubbery. Lipomas can occur anywhere on the body. But they are most common on the torso, neck, upper thighs, upper arms, and armpits. A lipoma does not turn into cancer. Lipomas usually are not treated, because most of them don't hurt or cause problems. But your doctor may remove a lipoma if it is painful, gets infected, or bothers you. Follow-up care is a key part of your treatment and safety. Be sure to make and go to all appointments, and call your doctor if you are having problems. It's also a good idea to know your test results and keep a list of the medicines you take. How can you care for yourself at home? · Lipomas usually need no care at home. · If your doctor removes a lipoma, follow directions for taking care of the cut (incision). When should you call for help? Call your doctor now or seek immediate medical care if: 
? · You have signs of infection, such as: 
¨ Increased pain, swelling, warmth, or redness. ¨ Red streaks leading from the lipoma. ¨ Pus draining from the lipoma. ¨ A fever. ? Watch closely for changes in your health, and be sure to contact your doctor if: 
? · The lipoma is growing or changing. ? · You do not get better as expected. Where can you learn more? Go to http://tucker-marcelo.info/. Enter T913 in the search box to learn more about \"Lipoma: Care Instructions. \" Current as of: October 13, 2016 Content Version: 11.4 © 0021-4394 Molplex. Care instructions adapted under license by 100du.tv (which disclaims liability or warranty for this information). If you have questions about a medical condition or this instruction, always ask your healthcare professional. [address] any warranty or liability for your use of this information. Introducing [facility] & HEALTH SERVICES! Dear Mirtha Alva: Thank you for requesting a [employer] account. Our records indicate that you already have an active [employer] account. You can access your account anytime at [email or web address] Did you know that you can access your hospital and ER discharge instructions at any time in [employer]? You can also review all of your test results from your hospital stay or ER visit. Additional Information If you have questions, please visit the Frequently Asked Questions section of the [employer] website at [email or web address] Remember, [employer] is NOT to be used for urgent needs. For medical emergencies, dial 911. Now available from your iPhone and Android! Please provide this summary of care documentation to your next provider. Your primary care clinician is listed as Yoel Melo. If you have any questions after today's visit, please call [phone].

## 2017-12-11 NOTE — PROGRESS NOTES
HISTORY OF PRESENT ILLNESS  Jac Amado is a 61 y.o. male who presents to center with a lump on the posterior aspect of left shoulder. He was getting a massage yesterday at a massage center and as the masseuse was massaging his left upper back shoulder she felt a lump and brought to his attention. He states he had not noticed it was there. No tenderness or pain is associated with it. Mass   The history is provided by the patient. This is a new problem. Episode onset: unknown. Nothing aggravates the symptoms. Nothing relieves the symptoms. Visit Vitals    /68 (BP 1 Location: Right arm, BP Patient Position: Sitting)  Comment: manual    Pulse (!) 59    Temp 97 °F (36.1 °C) (Oral)    Resp 16    Ht 6' (1.829 m)    Wt 208 lb (94.3 kg)    SpO2 96%    BMI 28.21 kg/m2     Past Medical History:   Diagnosis Date    Chronic pain     Hypercholesterolemia     Hypertension     IBS (irritable bowel syndrome)        Review of Systems   Constitutional: Negative for chills, fever and malaise/fatigue. Musculoskeletal: Negative for back pain. No arm pain   Skin: Negative for rash. Neurological: Positive for tingling (left lower arm occasionally during the day). Negative for focal weakness. Physical Exam   Constitutional: He appears well-developed and well-nourished. Cardiovascular: Intact distal pulses. Musculoskeletal:   Moderately large, circumscribed, soft, mobile raised subcutaneous lump present on posterior right shoulder. Non tender and no erythema or warmth   Neurological:   Normal sensation to touch of left arm   Vitals reviewed. ASSESSMENT and PLAN    ICD-10-CM ICD-9-CM    1. Lipoma of shoulder D17.20 214.1 REFERRAL TO GENERAL SURGERY     Patient reassured that the lump appears to be a lipoma. It is probably been there for quite some time but slowly gotten larger.   Referral given a general surgery for further evaluation and most likely removal.

## 2017-12-11 NOTE — PROGRESS NOTES
Leonard Burleson is a 61 y.o. male presents in office with c/o lump on left shoulder. Unsure of when it started. No pain and no drainage. Has noticed some tingling and numbness in left arm. Health Maintenance Due   Topic Date Due    Hepatitis C Screening  1960       1. Have you been to the ER, urgent care clinic since your last visit? Hospitalized since your last visit? No    2. Have you seen or consulted any other health care providers outside of the [facility] since your last visit? Include any pap smears or colon screening.  No

## 2018-01-03 ENCOUNTER — OFFICE VISIT (OUTPATIENT)
Dept: SURGERY | Age: 58
End: 2018-01-03

## 2018-01-03 VITALS
DIASTOLIC BLOOD PRESSURE: 78 MMHG | SYSTOLIC BLOOD PRESSURE: 144 MMHG | HEIGHT: 72 IN | BODY MASS INDEX: 28.61 KG/M2 | WEIGHT: 211.2 LBS | OXYGEN SATURATION: 95 % | RESPIRATION RATE: 18 BRPM | TEMPERATURE: 96.1 F | HEART RATE: 60 BPM

## 2018-01-03 DIAGNOSIS — M25.812 MASS OF JOINT OF LEFT SHOULDER: Primary | ICD-10-CM

## 2018-01-03 RX ORDER — METOPROLOL SUCCINATE 100 MG/1
TABLET, EXTENDED RELEASE ORAL DAILY
COMMUNITY
End: 2018-02-09 | Stop reason: SDUPTHER

## 2018-01-03 RX ORDER — AMLODIPINE BESYLATE 5 MG/1
5 TABLET ORAL DAILY
COMMUNITY
End: 2018-02-09 | Stop reason: SDUPTHER

## 2018-01-03 RX ORDER — PRAVASTATIN SODIUM 20 MG/1
20 TABLET ORAL
COMMUNITY
End: 2018-02-09 | Stop reason: SDUPTHER

## 2018-01-03 NOTE — PROGRESS NOTES
General Surgery Consult    Yen Martinez  Admit date: (Not on file)    MRN: [id number]     : 1960     Age: 61 y.o. Attending Physician: Vanda Law MD, [facility]      History of Present Illness:      Yen Martinez is a 61 y.o. male who presented with a left shoulder mass. He did notice the mass about few months ago, but he thinks he may have it for years but he's not sure. He said that the mass has not changed in size over the last few months. He is having some pain when he sit on his back. The pain is mild and intermittent. It does not radiate. He denies any fever or chills. He denies any night sweats. He did not have any radiological studies. Patient Active Problem List    Diagnosis Date Noted    Essential hypertension 09/15/2017    Hyperlipidemia 09/15/2017    Gastroesophageal reflux disease without esophagitis 09/15/2017     Past Medical History:   Diagnosis Date    Chronic pain     Hypercholesterolemia     Hypertension     IBS (irritable bowel syndrome)       Past Surgical History:   Procedure Laterality Date    HX ACL RECONSTRUCTION      HX ACL RECONSTRUCTION Bilateral     HX ORTHOPAEDIC        Social History   Substance Use Topics    Smoking status: Never Smoker    Smokeless tobacco: Never Used    Alcohol use Yes      History   Smoking Status    Never Smoker   Smokeless Tobacco    Never Used     Family History   Problem Relation Age of Onset    Lupus Mother     Heart Disease Father     Hypertension Father     Cancer Father       Current Outpatient Prescriptions   Medication Sig    metoprolol succinate (TOPROL XL) 100 mg tablet Take  by mouth daily.  amLODIPine (NORVASC) 5 mg tablet Take 5 mg by mouth daily.  pravastatin (PRAVACHOL) 20 mg tablet Take 20 mg by mouth nightly.  tolterodine ER (DETROL LA) 4 mg ER capsule Take 1 Cap by mouth daily.  esomeprazole (NEXIUM) 40 mg capsule Take 1 Cap by mouth daily.     raNITIdine (ZANTAC) 150 mg tablet Take 150 mg by mouth nightly.  aspirin delayed-release 81 mg tablet Take 81 mg by mouth daily.  lysine (L-LYSINE) 500 mg tab tablet Take 500 mg by mouth daily.  Bifidobacterium Infantis (ALIGN) 4 mg cap Take 4 mg by mouth daily.  methylcellulose (CITRUCEL) 500 mg tablet Take 1,000 mg by mouth nightly.  multivitamin (MEN'S MULTI-VITAMIN) tablet Take 1 Tab by mouth daily. No current facility-administered medications for this visit. Allergies   Allergen Reactions    Doxycycline Anaphylaxis    Keflex [Cephalexin] Anaphylaxis    Pcn [Penicillins] Hives          Review of Systems:  Constitutional: negative  Eyes: negative  Ears, Nose, Mouth, Throat, and Face: negative  Respiratory: negative  Cardiovascular: negative  Gastrointestinal: negative  Genitourinary:negative  Integument/Breast: negative  Hematologic/Lymphatic: negative  Musculoskeletal:positive for left posterior shoulder mass  Neurological: negative  Behavioral/Psychiatric: negative  Endocrine: negative  Allergic/Immunologic: negative    Objective:     Visit Vitals    /78 (BP 1 Location: Right arm, BP Patient Position: Sitting)    Pulse 60    Temp 96.1 °F (35.6 °C) (Oral)    Resp 18    Ht 6' (1.829 m)    Wt 95.8 kg (211 lb 3.2 oz)    SpO2 95%    BMI 28.64 kg/m2       Physical Exam:      General:  in no apparent distress, alert and oriented times 3   Eyes:  conjunctivae and sclerae normal, pupils equal, round, reactive to light   Throat & Neck: no erythema or exudates noted and neck supple and symmetrical; no palpable masses   Lungs:   clear to auscultation bilaterally   Heart:  Regular rate and rhythm   Abdomen:   rounded, soft, nontender, nondistended, no masses or organomegaly. No abdominal wall hernias   Left shoulder:  There is a 5 x 3 cm left posterior shoulder mass that is soft,  non tender, easily movable with no overlying skin changes consistent with a possible lipoma   Skin: Normal.       Imaging and Lab Review: CBC:   Lab Results   Component Value Date/Time    WBC 6.5 07/17/2017 03:32 PM    RBC 5.00 07/17/2017 03:32 PM    HGB 15.0 07/17/2017 03:32 PM    HCT 44.7 07/17/2017 03:32 PM    PLATELET 253 25/05/3183 03:32 PM     BMP:   Lab Results   Component Value Date/Time    Glucose 103 07/17/2017 03:32 PM    Sodium 141 07/17/2017 03:32 PM    Potassium 4.1 07/17/2017 03:32 PM    Chloride 105 07/17/2017 03:32 PM    CO2 28 07/17/2017 03:32 PM    BUN 16 07/17/2017 03:32 PM    Creatinine 1.01 07/17/2017 03:32 PM    Calcium 8.8 07/17/2017 03:32 PM     CMP:  Lab Results   Component Value Date/Time    Glucose 103 07/17/2017 03:32 PM    Sodium 141 07/17/2017 03:32 PM    Potassium 4.1 07/17/2017 03:32 PM    Chloride 105 07/17/2017 03:32 PM    CO2 28 07/17/2017 03:32 PM    BUN 16 07/17/2017 03:32 PM    Creatinine 1.01 07/17/2017 03:32 PM    Calcium 8.8 07/17/2017 03:32 PM    Anion gap 8 07/17/2017 03:32 PM    BUN/Creatinine ratio 16 07/17/2017 03:32 PM    Alk. phosphatase 58 07/17/2017 03:32 PM    Protein, total 7.0 07/17/2017 03:32 PM    Albumin 4.3 07/17/2017 03:32 PM    Globulin 2.7 07/17/2017 03:32 PM    A-G Ratio 1.6 07/17/2017 03:32 PM       No results found for this or any previous visit (from the past 24 hour(s)). images and reports reviewed    Assessment:   Karyle Slider is a 61 y.o. male is presenting with left posterior shoulder mass.      Plan:     Scheduled for excision of left shoulder mass    Please call me if you have any questions (cell phone: [phone])     Signed By: Marilyn Neil MD     January 3, 2018

## 2018-01-03 NOTE — MR AVS SNAPSHOT
Visit Information Date & Time Provider Department Dept. Phone Encounter #  
 1/3/2018  8:00 AM Tammy Bliss MD [facility] [phone] [id number] Your Appointments 2/9/2018  1:00 PM  
POST OP with Tammy Bliss MD  
[facility] [address]) Appt Note: [facility] [facility] [address] [place] [place] [address], [address] [phone]  
  
    
 11/26/2018  9:30 AM  
ESTABLISHED PATIENT with Yi Mann MD  
[facility] [address]) Appt Note: Return in about 1 year (around 11/27/2018). [address] [place] [address]  
[phone]  
  
   
 [facility] 27 [phone] Upcoming Health Maintenance Date Due Hepatitis C Screening 1960 FOBT Q 1 YEAR AGE 50-75 3/29/2018* DTaP/Tdap/Td series (2 - Td) 7/17/2027 *Topic was postponed. The date shown is not the original due date. Allergies as of 1/3/2018  Review Complete On: 1/3/2018 By: Zack Bodily Severity Noted Reaction Type Reactions Doxycycline High 07/17/2017    Anaphylaxis Keflex [Cephalexin] High 07/17/2017    Anaphylaxis Pcn [Penicillins]  07/17/2017    Hives Current Immunizations  Reviewed on 9/15/2017 Name Date Influenza Vaccine (Quad) PF 9/15/2017 Not reviewed this visit Vitals BP Pulse Temp Resp Height(growth percentile) Weight(growth percentile) 144/78 (BP 1 Location: Right arm, BP Patient Position: Sitting) 60 96.1 °F (35.6 °C) (Oral) 18 6' (1.829 m) 211 lb 3.2 oz (95.8 kg) SpO2 BMI Smoking Status 95% 28.64 kg/m2 Never Smoker BMI and BSA Data Body Mass Index Body Surface Area  
 28.64 kg/m 2 2.21 m 2 Preferred Pharmacy Pharmacy Name Phone  [facility], [facility] [address] Liseth Garsia [phone] Your Updated Medication List  
  
   
This list is accurate as of: 1/3/18  8:31 AM.  Always use your most recent med list.  
  
  
  
  
 ALIGN 4 mg Cap Generic drug:  Bifidobacterium Infantis Take 4 mg by mouth daily. aspirin delayed-release 81 mg tablet Take 81 mg by mouth daily. CITRUCEL 500 mg tablet Generic drug:  methylcellulose Take 1,000 mg by mouth nightly. esomeprazole 40 mg capsule Commonly known as:  Coye Pals Take 1 Cap by mouth daily. L-LYSINE 500 mg Tab tablet Generic drug:  lysine Take 500 mg by mouth daily. MEN'S MULTI-VITAMIN tablet Generic drug:  multivitamin Take 1 Tab by mouth daily. NORVASC 5 mg tablet Generic drug:  amLODIPine Take 5 mg by mouth daily. pravastatin 20 mg tablet Commonly known as:  PRAVACHOL Take 20 mg by mouth nightly. tolterodine ER 4 mg ER capsule Commonly known as:  Cullen Shiley Take 1 Cap by mouth daily. TOPROL  mg tablet Generic drug:  metoprolol succinate Take  by mouth daily. ZANTAC 150 mg tablet Generic drug:  raNITIdine Take 150 mg by mouth nightly. Patient Instructions If you have any questions or concerns about today's appointment, the verbal and/or written instructions you were given for follow up care, please call our office at [phone]. [facility] - [address], [address] 
 
[phone] office [phone] fax Gisela Topete PATIENT PRE AND POST OPERATIVE INSTRUCTIONS [address] 
[phone] Before Surgery Instructions:  
1) You must have someone available to drive you to and from your procedure and stay with you for the first 24 hours. 2) It is very important that you have nothing to eat or drink after midnight the night before your surgery.  This includes chewing gum or sucking on hard candy. Take only heart, blood pressure and cholesterol medications the morning of surgery with only a sip of water. 3) Please stop taking Plavix 10 days prior to your surgery. Stop taking Coumadin 5 days prior to your surgery. Stop taking all Aspirin or Aspirin containing products 7 days prior to your surgery. Stop taking Advil, Motrin, Aleve, and etc. 3 days prior to your surgery. 4) If you take any diabetic medications please consult with your primary care physician on how to take them on the day of your surgery 5) Please stop all Herbal products 2 weeks prior to your surgery. 6) Please arrive at the hospital 2 hours prior to your surgery, unless you have been otherwise instructed. 7) Patients having an operation on their colon will be given a separate instruction sheet on their Bowel Prep. 8) For any pre-operative work up check in at the main entrance to [facility], and then go to Patient Registration. These studies are done on a walk in basis they are open from 7:00am to 5:00pm Monday through Friday. 9) Please wash your surgical site the morning of your surgery with soap and water. 10) If you are of child bearing age you will have pregnancy test done the morning of your surgery as soon as you arrive. 11) You may be contacted to change your surgery time. At times this is necessary due to equipment or staffing needs. After Surgery Instructions: You will need to be seen in the office for a follow-up visit 7-14 days after your surgery. Please call after you have had the procedure to make this appointment. Unless otherwise instructed, you may remove your outer bandage and shower 48 hours after your surgery. If you develop a fever greater than 101, have any significant drainage, bleeding, swelling and/or pus of the wound. Please call our office immediately.  
 
Surgery Date and Time:  Tuesday, January 30, 2018 at 12:15pm 
 
 Please check in at [facility], enter through the Emergency Room entrance and go up to the second floor. Please check in by 10:15am the day of your surgery. You may contact Brandon Loyola with any questions at [phone]. Introducing [facility] SERVICES! Dear Sharif Dotson: 
Thank you for requesting a Nobl account. Our records indicate that you already have an active [employer]l account. You can access your account anytime at [email or web address] Did you know that you can access your hospital and ER discharge instructions at any time in Nobl? You can also review all of your test results from your hospital stay or ER visit. Additional Information If you have questions, please visit the Frequently Asked Questions section of the Nobl website at [email or web address] Remember, Nobl is NOT to be used for urgent needs. For medical emergencies, dial 911. Now available from your iPhone and Android! Please provide this summary of care documentation to your next provider. Your primary care clinician is listed as Maxi Sims. If you have any questions after today's visit, please call [phone].

## 2018-01-03 NOTE — LETTER
1/3/2018 8:10 AM 
 
Patient:  Oral Eliasasant YOB: 1960 Date of Visit: 1/3/2018 Herbie Eden MD 
[address] [facility] [address] [place] [address] VIA In Basket Dear Herbie Eden MD, Thank you for referring Mr. Valeriano Castillo to [place] [address] for evaluation and treatment. Below are the relevant portions of my assessment and plan of care. Thank you very much for your referral of Mr. Valeriano Castillo. If you have questions, please do not hesitate to call me. I look forward to following Mr. Alexey Aj along with you and will keep you updated as to his progress. Sincerely, Lori Puente MD

## 2018-01-03 NOTE — PROGRESS NOTES
Claude Hunter is a 61 y.o. male who presents for a surgical evaluation of a lipoma left posterior shoulder, which he relays as having an onset of a month ago.

## 2018-01-03 NOTE — PATIENT INSTRUCTIONS
If you have any questions or concerns about today's appointment, the verbal and/or written instructions you were given for follow up care, please call our office at [phone]. [facility] - [address]    [phone] office  [phone] fax    . Hubert Suazo PATIENT PRE AND POST OPERATIVE INSTRUCTIONS     [facility]  [phone]    Before Surgery Instructions:   1) You must have someone available to drive you to and from your procedure and stay with you for the first 24 hours. 2) It is very important that you have nothing to eat or drink after midnight the night before your surgery. This includes chewing gum or sucking on hard candy. Take only heart, blood pressure and cholesterol medications the morning of surgery with only a sip of water. 3) Please stop taking Plavix 10 days prior to your surgery. Stop taking Coumadin 5 days prior to your surgery. Stop taking all Aspirin or Aspirin containing products 7 days prior to your surgery. Stop taking Advil, Motrin, Aleve, and etc. 3 days prior to your surgery. 4) If you take any diabetic medications please consult with your primary care physician on how to take them on the day of your surgery  5) Please stop all Herbal products 2 weeks prior to your surgery. 6) Please arrive at the hospital 2 hours prior to your surgery, unless you have been otherwise instructed. 7) Patients having an operation on their colon will be given a separate instruction sheet on their Bowel Prep. 8) For any pre-operative work up check in at the main entrance to [facility], and then go to Patient Registration. These studies are done on a walk in basis they are open from 7:00am to 5:00pm Monday through Friday. 9) Please wash your surgical site the morning of your surgery with soap and water.   10) If you are of child bearing age you will have pregnancy test done the morning of your surgery as soon as you arrive. 11) You may be contacted to change your surgery time. At times this is necessary due to equipment or staffing needs. After Surgery Instructions: You will need to be seen in the office for a follow-up visit 7-14 days after your surgery. Please call after you have had the procedure to make this appointment. Unless otherwise instructed, you may remove your outer bandage and shower 48 hours after your surgery. If you develop a fever greater than 101, have any significant drainage, bleeding, swelling and/or pus of the wound. Please call our office immediately. Surgery Date and Time:  Tuesday, January 30, 2018 at 12:15pm    Please check in at [facility], enter through the Emergency Room entrance and go up to the second floor. Please check in by 10:15am the day of your surgery. You may contact Arron Luna with any questions at [phone].

## 2018-01-29 ENCOUNTER — ANESTHESIA EVENT (OUTPATIENT)
Dept: SURGERY | Age: 58
End: 2018-01-29
Payer: COMMERCIAL

## 2018-01-30 ENCOUNTER — ANESTHESIA (OUTPATIENT)
Dept: SURGERY | Age: 58
End: 2018-01-30
Payer: COMMERCIAL

## 2018-01-30 ENCOUNTER — HOSPITAL ENCOUNTER (OUTPATIENT)
Age: 58
Setting detail: OUTPATIENT SURGERY
Discharge: HOME | End: 2018-01-30
Attending: SURGERY | Admitting: SURGERY
Payer: COMMERCIAL

## 2018-01-30 VITALS
DIASTOLIC BLOOD PRESSURE: 77 MMHG | RESPIRATION RATE: 12 BRPM | HEART RATE: 56 BPM | OXYGEN SATURATION: 98 % | SYSTOLIC BLOOD PRESSURE: 131 MMHG | HEIGHT: 71 IN | TEMPERATURE: 98.1 F | BODY MASS INDEX: 29.82 KG/M2 | WEIGHT: 213 LBS

## 2018-01-30 DIAGNOSIS — M25.812 MASS OF JOINT OF LEFT SHOULDER: Primary | ICD-10-CM

## 2018-01-30 PROCEDURE — 77030011267 HC ELECTRD BLD COVD -A: Performed by: SURGERY

## 2018-01-30 PROCEDURE — 74011250636 HC RX REV CODE- 250/636: Performed by: SURGERY

## 2018-01-30 PROCEDURE — 76060000032 HC ANESTHESIA 0.5 TO 1 HR: Performed by: SURGERY

## 2018-01-30 PROCEDURE — 74011000250 HC RX REV CODE- 250

## 2018-01-30 PROCEDURE — 74011250636 HC RX REV CODE- 250/636

## 2018-01-30 PROCEDURE — 77030002933 HC SUT MCRYL J&J -A: Performed by: SURGERY

## 2018-01-30 PROCEDURE — 76010000160 HC OR TIME 0.5 TO 1 HR INTENSV-TIER 1: Performed by: SURGERY

## 2018-01-30 PROCEDURE — 74011000272 HC RX REV CODE- 272: Performed by: SURGERY

## 2018-01-30 PROCEDURE — 76210000020 HC REC RM PH II FIRST 0.5 HR: Performed by: SURGERY

## 2018-01-30 PROCEDURE — 76210000016 HC OR PH I REC 1 TO 1.5 HR: Performed by: SURGERY

## 2018-01-30 PROCEDURE — 74011250637 HC RX REV CODE- 250/637

## 2018-01-30 PROCEDURE — 77030031139 HC SUT VCRL2 J&J -A: Performed by: SURGERY

## 2018-01-30 PROCEDURE — 77030018842 HC SOL IRR SOD CL 9% BAXT -A: Performed by: SURGERY

## 2018-01-30 PROCEDURE — 74011250637 HC RX REV CODE- 250/637: Performed by: NURSE ANESTHETIST, CERTIFIED REGISTERED

## 2018-01-30 PROCEDURE — 77030012510 HC MSK AIRWY LMA TELE -B: Performed by: NURSE ANESTHETIST, CERTIFIED REGISTERED

## 2018-01-30 PROCEDURE — 74011250636 HC RX REV CODE- 250/636: Performed by: NURSE ANESTHETIST, CERTIFIED REGISTERED

## 2018-01-30 PROCEDURE — 88304 TISSUE EXAM BY PATHOLOGIST: CPT | Performed by: SURGERY

## 2018-01-30 PROCEDURE — 77030032490 HC SLV COMPR SCD KNE COVD -B: Performed by: SURGERY

## 2018-01-30 PROCEDURE — 77030010507 HC ADH SKN DERMBND J&J -B: Performed by: SURGERY

## 2018-01-30 RX ORDER — ONDANSETRON 2 MG/ML
INJECTION INTRAMUSCULAR; INTRAVENOUS AS NEEDED
Status: DISCONTINUED | OUTPATIENT
Start: 2018-01-30 | End: 2018-01-30 | Stop reason: HOSPADM

## 2018-01-30 RX ORDER — NALOXONE HYDROCHLORIDE 0.4 MG/ML
0.04 INJECTION, SOLUTION INTRAMUSCULAR; INTRAVENOUS; SUBCUTANEOUS
Status: DISCONTINUED | OUTPATIENT
Start: 2018-01-30 | End: 2018-01-30 | Stop reason: HOSPADM

## 2018-01-30 RX ORDER — FENTANYL CITRATE 50 UG/ML
25 INJECTION, SOLUTION INTRAMUSCULAR; INTRAVENOUS AS NEEDED
Status: DISCONTINUED | OUTPATIENT
Start: 2018-01-30 | End: 2018-01-30 | Stop reason: HOSPADM

## 2018-01-30 RX ORDER — OXYCODONE AND ACETAMINOPHEN 5; 325 MG/1; MG/1
1 TABLET ORAL
Qty: 24 TAB | Refills: 0 | Status: SHIPPED | OUTPATIENT
Start: 2018-01-30 | End: 2018-04-11

## 2018-01-30 RX ORDER — KETOROLAC TROMETHAMINE 30 MG/ML
INJECTION, SOLUTION INTRAMUSCULAR; INTRAVENOUS AS NEEDED
Status: DISCONTINUED | OUTPATIENT
Start: 2018-01-30 | End: 2018-01-30 | Stop reason: HOSPADM

## 2018-01-30 RX ORDER — FAMOTIDINE 20 MG/1
TABLET, FILM COATED ORAL
Status: COMPLETED
Start: 2018-01-30 | End: 2018-01-30

## 2018-01-30 RX ORDER — FLUMAZENIL 0.1 MG/ML
0.2 INJECTION INTRAVENOUS
Status: DISCONTINUED | OUTPATIENT
Start: 2018-01-30 | End: 2018-01-30 | Stop reason: HOSPADM

## 2018-01-30 RX ORDER — FENTANYL CITRATE 50 UG/ML
INJECTION, SOLUTION INTRAMUSCULAR; INTRAVENOUS AS NEEDED
Status: DISCONTINUED | OUTPATIENT
Start: 2018-01-30 | End: 2018-01-30 | Stop reason: HOSPADM

## 2018-01-30 RX ORDER — CLINDAMYCIN PHOSPHATE 900 MG/50ML
900 INJECTION INTRAVENOUS ONCE
Status: COMPLETED | OUTPATIENT
Start: 2018-01-30 | End: 2018-01-30

## 2018-01-30 RX ORDER — LIDOCAINE HYDROCHLORIDE 20 MG/ML
INJECTION, SOLUTION EPIDURAL; INFILTRATION; INTRACAUDAL; PERINEURAL AS NEEDED
Status: DISCONTINUED | OUTPATIENT
Start: 2018-01-30 | End: 2018-01-30 | Stop reason: HOSPADM

## 2018-01-30 RX ORDER — HYDROCODONE BITARTRATE AND ACETAMINOPHEN 5; 325 MG/1; MG/1
2 TABLET ORAL
Status: COMPLETED | OUTPATIENT
Start: 2018-01-30 | End: 2018-01-30

## 2018-01-30 RX ORDER — FAMOTIDINE 20 MG/1
20 TABLET, FILM COATED ORAL ONCE
Status: COMPLETED | OUTPATIENT
Start: 2018-01-31 | End: 2018-01-30

## 2018-01-30 RX ORDER — FENTANYL CITRATE 50 UG/ML
50 INJECTION, SOLUTION INTRAMUSCULAR; INTRAVENOUS
Status: DISCONTINUED | OUTPATIENT
Start: 2018-01-30 | End: 2018-01-30 | Stop reason: HOSPADM

## 2018-01-30 RX ORDER — MIDAZOLAM HYDROCHLORIDE 1 MG/ML
INJECTION, SOLUTION INTRAMUSCULAR; INTRAVENOUS AS NEEDED
Status: DISCONTINUED | OUTPATIENT
Start: 2018-01-30 | End: 2018-01-30 | Stop reason: HOSPADM

## 2018-01-30 RX ORDER — SODIUM CHLORIDE, SODIUM LACTATE, POTASSIUM CHLORIDE, CALCIUM CHLORIDE 600; 310; 30; 20 MG/100ML; MG/100ML; MG/100ML; MG/100ML
75 INJECTION, SOLUTION INTRAVENOUS CONTINUOUS
Status: DISCONTINUED | OUTPATIENT
Start: 2018-01-31 | End: 2018-01-30 | Stop reason: HOSPADM

## 2018-01-30 RX ORDER — PROPOFOL 10 MG/ML
INJECTION, EMULSION INTRAVENOUS AS NEEDED
Status: DISCONTINUED | OUTPATIENT
Start: 2018-01-30 | End: 2018-01-30 | Stop reason: HOSPADM

## 2018-01-30 RX ADMIN — PROPOFOL 30 MG: 10 INJECTION, EMULSION INTRAVENOUS at 12:30

## 2018-01-30 RX ADMIN — CLINDAMYCIN PHOSPHATE 900 MG: 900 INJECTION INTRAVENOUS at 12:29

## 2018-01-30 RX ADMIN — MIDAZOLAM HYDROCHLORIDE 2 MG: 1 INJECTION, SOLUTION INTRAMUSCULAR; INTRAVENOUS at 12:20

## 2018-01-30 RX ADMIN — PROPOFOL 170 MG: 10 INJECTION, EMULSION INTRAVENOUS at 12:26

## 2018-01-30 RX ADMIN — FENTANYL CITRATE 50 MCG: 50 INJECTION, SOLUTION INTRAMUSCULAR; INTRAVENOUS at 12:30

## 2018-01-30 RX ADMIN — FAMOTIDINE 20 MG: 20 TABLET, FILM COATED ORAL at 11:03

## 2018-01-30 RX ADMIN — FENTANYL CITRATE 50 MCG: 50 INJECTION, SOLUTION INTRAMUSCULAR; INTRAVENOUS at 12:23

## 2018-01-30 RX ADMIN — KETOROLAC TROMETHAMINE 30 MG: 30 INJECTION, SOLUTION INTRAMUSCULAR; INTRAVENOUS at 12:52

## 2018-01-30 RX ADMIN — SODIUM CHLORIDE, SODIUM LACTATE, POTASSIUM CHLORIDE, AND CALCIUM CHLORIDE 75 ML/HR: 600; 310; 30; 20 INJECTION, SOLUTION INTRAVENOUS at 11:04

## 2018-01-30 RX ADMIN — HYDROCODONE BITARTRATE AND ACETAMINOPHEN 2 TABLET: 5; 325 TABLET ORAL at 14:31

## 2018-01-30 RX ADMIN — ONDANSETRON 4 MG: 2 INJECTION INTRAMUSCULAR; INTRAVENOUS at 12:34

## 2018-01-30 RX ADMIN — LIDOCAINE HYDROCHLORIDE 40 MG: 20 INJECTION, SOLUTION EPIDURAL; INFILTRATION; INTRACAUDAL; PERINEURAL at 12:26

## 2018-01-30 NOTE — ANESTHESIA POSTPROCEDURE EVALUATION
Post-Anesthesia Evaluation and Assessment    Patient: Obdulio Guzman MRN: [id number]  SSN: [id number]    YOB: 1960  Age: 61 y.o. Sex: male       Cardiovascular Function/Vital Signs  Visit Vitals    /81    Pulse (!) 57    Temp 36.7 °C (98.1 °F)    Resp 14    Ht 5' 11\" (1.803 m)    Wt 96.6 kg (213 lb)    SpO2 94%    BMI 29.71 kg/m2       Patient is status post general anesthesia for Procedure(s):  EXCISION OF LARGE LEFT SHOULDER MASS. Nausea/Vomiting: None    Postoperative hydration reviewed and adequate. Pain:  Pain Scale 1: Numeric (0 - 10) (01/30/18 1326)  Pain Intensity 1: 2 (01/30/18 1326)   Managed    Neurological Status:   Neuro (WDL): Within Defined Limits (01/30/18 1259)   At baseline    Mental Status and Level of Consciousness: Arousable    Pulmonary Status:   O2 Device: Oxygen mask (01/30/18 1302)   Adequate oxygenation and airway patent    Complications related to anesthesia: None    Post-anesthesia assessment completed.  No concerns    Signed By: Mary Benavides MD     January 30, 2018

## 2018-01-30 NOTE — BRIEF OP NOTE
BRIEF OPERATIVE NOTE    Date of Procedure: 1/30/2018   Preoperative Diagnosis: SHOULDER MASS R22.32  Postoperative Diagnosis: SHOULDER MASS R22.32    Procedure(s):  EXCISION OF LARGE LEFT SHOULDER MASS  Surgeon(s) and Role:     * Walker Lara MD - Primary         Assistant Staff: None      Surgical Staff:  Circ-1: Shivam Ballard RN  Circ-Relief: Ralph Velasquez  Scrub Tech-Relief: Everett Jason  Surg Asst-Relief: Cleatus Ajay  Event Time In   Incision Start 1233   Incision Close      Anesthesia: General   Estimated Blood Loss: Minimal  Specimens:   ID Type Source Tests Collected by Time Destination   1 : LEFT SHOULDER MASS Preservative   Walker Lara MD 1/30/2018 1240 Pathology      Findings: Large intra-muscular mass   Complications: None  Implants: * No implants in log *

## 2018-01-30 NOTE — OP NOTES
[facility] Emergency Department DIVISION  OPERATIVE REPORT    Ankita Panda  MR#: [id number]  : 1960  ACCOUNT #: [de-identified]   DATE OF SERVICE: 2018    SURGEON: Loretta Pickett. Justina Potts MD.     PREOPERATIVE DIAGNOSIS:  Left shoulder mass. POSTOPERATIVE DIAGNOSIS:  Large left deep shoulder mass about 7 cm in length, intramuscular below the fascia. PROCEDURES PERFORMED:  Excision of large deep left shoulder mass. ANESTHESIA:  General.     COMPLICATIONS:  None. SPECIMENS REMOVED:  Left shoulder mass. ESTIMATED BLOOD LOSS:  Minimal.      DESCRIPTION OF PROCEDURE: The patient was brought to the operating room and anesthesia was induced. Scrubbing and draping of the left shoulder was done in the usual manner after the patient was placed left side up. A time out was performed. A skin incision along the long axis of the mass was performed, deepened through skin and subcutaneous tissue. At this point, the fascia was identified. It was divided. The mass was intramuscular below the fascia. It was about 7 cm x 4 cm in size. This was excised completely. It was sent for permanent pathology and the deeper tissues of the fascia were closed with running #2-0 Vicryl suture and the subcutaneous tissue was approximated with interrupted 3-0 Vicryl suture and then the skin was closed with 4-0 Monocryl.       MD Cristobal Mcpherson  D: 2018 13:35     T: 2018 13:49  JOB #: [id number]

## 2018-01-30 NOTE — ANESTHESIA PREPROCEDURE EVALUATION
Anesthetic History   No history of anesthetic complications            Review of Systems / Medical History  Patient summary reviewed, nursing notes reviewed and pertinent labs reviewed    Pulmonary  Within defined limits                 Neuro/Psych   Within defined limits           Cardiovascular    Hypertension: well controlled                Comments: Pt has hearing aids which he will remove prior to going to the OR.    GI/Hepatic/Renal     GERD: well controlled           Endo/Other             Other Findings   Comments: Documentation of current medication  Current medications obtained, documented and obtained? YES      Risk Factors for Postoperative nausea/vomiting:       History of postoperative nausea/vomiting? NO       Female? NO       Motion sickness? NO       Intended opioid administration for postoperative analgesia? YES      Smoking Abstinence:  Current Smoker? NO  Elective Surgery? YES  Seen preoperatively by anesthesiologist or proxy prior to day of surgery? YES  Pt abstained from smoking 24 hours prior to anesthesia?  N/A    Preventive care/screening for High Blood Pressure:  Aged 18 years and older: YES  Screened for high blood pressure: YES  Patients with high blood pressure referred to primary care provider   for BP management: YES               Physical Exam    Airway  Mallampati: II  TM Distance: 4 - 6 cm  Neck ROM: normal range of motion   Mouth opening: Normal     Cardiovascular  Regular rate and rhythm,  S1 and S2 normal,  no murmur, click, rub, or gallop  Rhythm: regular  Rate: normal         Dental  No notable dental hx       Pulmonary  Breath sounds clear to auscultation               Abdominal  GI exam deferred       Other Findings            Anesthetic Plan    ASA: 2  Anesthesia type: general          Induction: Intravenous  Anesthetic plan and risks discussed with: Patient

## 2018-01-30 NOTE — PROGRESS NOTES
Date of Surgery Update:  Nereyda Abreu was seen and examined. History and physical has been reviewed. The patient has been examined. There have been no significant clinical changes since the completion of the originally dated History and Physical. Will proceed with excision of left shoulder mass.      Signed By: Arminda Shane MD     January 30, 2018 11:56 AM

## 2018-01-30 NOTE — PERIOP NOTES
Rec'd pt from OR via stretcher, attached to monitor, VS stable, slight obstruction noted with chin lift given, acknowledge OR, anesthesia, and MAR report, will continue to monitor

## 2018-01-30 NOTE — IP AVS SNAPSHOT
[address] [address] [address] 
[phone] Patient: Saad Cole MRN: [id number] MVM:[id number] About your hospitalization You were admitted on:  January 30, 2018 You last received care in the:  [facility] PACU You were discharged on:  January 30, 2018 Why you were hospitalized Your primary diagnosis was:  Not on File Follow-up Information Follow up With Details Comments Contact Info Lesly Stallworth MD   [address] [address] [employer] [facility] [address] 
[phone] Your Scheduled Appointments Friday February 09, 2018  1:00 PM EST  
POST OP with MD [facility] [address] [facility] [address] [facility]  
[phone] Discharge Orders None A check marin indicates which time of day the medication should be taken. My Medications START taking these medications Instructions Each Dose to Equal  
 Morning Noon Evening Bedtime  
 oxyCODONE-acetaminophen 5-325 mg per tablet Commonly known as:  PERCOCET Your last dose was: Your next dose is: Take 1 Tab by mouth every four (4) hours as needed for Pain. Max Daily Amount: 6 Tabs. 1 Tab CONTINUE taking these medications Instructions Each Dose to Equal  
 Morning Noon Evening Bedtime ALIGN 4 mg Cap Generic drug:  Bifidobacterium Infantis Your last dose was: Your next dose is: Take 4 mg by mouth daily. 4 mg  
    
   
   
   
  
 aspirin delayed-release 81 mg tablet Your last dose was: Your next dose is: Take 81 mg by mouth daily. 81 mg  
    
   
   
   
  
 CITRUCEL 500 mg tablet Generic drug:  methylcellulose Your last dose was: Your next dose is: Take 1,000 mg by mouth nightly. 1000 mg  
    
   
   
   
  
 esomeprazole 40 mg capsule Commonly known as:  Justus Shine Your last dose was: Your next dose is: Take 1 Cap by mouth daily. 40 mg  
    
   
   
   
  
 L-LYSINE 500 mg Tab tablet Generic drug:  lysine Your last dose was: Your next dose is: Take 500 mg by mouth daily. 500 mg MEN'S MULTI-VITAMIN tablet Generic drug:  multivitamin Your last dose was: Your next dose is: Take 1 Tab by mouth daily. 1 Tab NORVASC 5 mg tablet Generic drug:  amLODIPine Your last dose was: Your next dose is: Take 5 mg by mouth daily. 5 mg  
    
   
   
   
  
 pravastatin 20 mg tablet Commonly known as:  PRAVACHOL Your last dose was: Your next dose is: Take 20 mg by mouth nightly. 20 mg  
    
   
   
   
  
 tolterodine ER 4 mg ER capsule Commonly known as:  Brittanie Theodore Your last dose was: Your next dose is: Take 1 Cap by mouth daily. 4 mg TOPROL  mg tablet Generic drug:  metoprolol succinate Your last dose was: Your next dose is: Take  by mouth daily. ZANTAC 150 mg tablet Generic drug:  raNITIdine Your last dose was: Your next dose is: Take 150 mg by mouth nightly. 150 mg Where to Get Your Medications Information on where to get these meds will be given to you by the nurse or doctor. ! Ask your nurse or doctor about these medications  
  oxyCODONE-acetaminophen 5-325 mg per tablet Discharge Instructions Instructions Following Ambulatory Surgery Patient: Mane Ramey MRN: [id number]  SSN: [id number] YOB: 1960  Age: 61 y.o. Sex: male Activity · As tolerated, walking encourage, stairs are okay · Avoid strenuous activities - no lifting anything heavier than 15 pounds. · You may shower at home after 48 hours. Diet · Regular diet after nausea from the anesthetic has passed. Pain · Take pain medication as directed by your doctor ·  Call your doctor if pain is NOT relieved by medication Dressing Care · Remove outer dressing (if any) after 48 hours. Leave steri-strips (if any) in place until they fall off. After Anesthesia · For the first 24 hours: DO NOT Drive, Drink alcoholic beverages, or Make important decisions · Be aware of dizziness following anesthesia and while taking pain medication Call your doctor if 
· Excessive bleeding that does not stop after holding mild pressure over the area · Temperature of 101 degrees F or above · Redness,excessive swelling or bruising, and/or green or yellow, smelly discharge from incision · If nausea and vomiting continues Follow-Up Phone Calls · Call the office at [phone] to make your follow-up appointment Appointment date/time: 2 weeks after the surgery. Dr. William Franco cell phone number is [phone]. Please call me if you have any concerns or questions. DISCHARGE SUMMARY from Nurse PATIENT INSTRUCTIONS: 
 
After general anesthesia or intravenous sedation, for 24 hours or while taking prescription Narcotics: · Limit your activities · Do not drive and operate hazardous machinery · Do not make important personal or business decisions · Do  not drink alcoholic beverages · If you have not urinated within 8 hours after discharge, please contact your surgeon on call. Report the following to your surgeon: 
· Excessive pain, swelling, redness or odor of or around the surgical area · Temperature over 100.5 · Nausea and vomiting lasting longer than 4 hours or if unable to take medications · Any signs of decreased circulation or nerve impairment to extremity: change in color, persistent  numbness, tingling, coldness or increase pain · Any questions What to do at Home: No smoking/ No tobacco products/ Avoid exposure to second hand smoke Surgeon General's Warning:  Quitting smoking now greatly reduces serious risk to your health. Obesity, smoking, and sedentary lifestyle greatly increases your risk for illness A healthy diet, regular physical exercise & weight monitoring are important for maintaining a healthy lifestyle You may be retaining fluid if you have a history of heart failure or if you experience any of the following symptoms:  Weight gain of 3 pounds or more overnight or 5 pounds in a week, increased swelling in our hands or feet or shortness of breath while lying flat in bed. Please call your doctor as soon as you notice any of these symptoms; do not wait until your next office visit. Recognize signs and symptoms of STROKE: 
 
F-face looks uneven A-arms unable to move or move unevenly S-speech slurred or non-existent T-time-call 911 as soon as signs and symptoms begin-DO NOT go Back to bed or wait to see if you get better-TIME IS BRAIN. Warning Signs of HEART ATTACK Call 911 if you have these symptoms: 
? Chest discomfort. Most heart attacks involve discomfort in the center of the chest that lasts more than a few minutes, or that goes away and comes back. It can feel like uncomfortable pressure, squeezing, fullness, or pain. ? Discomfort in other areas of the upper body. Symptoms can include pain or discomfort in one or both arms, the back, neck, jaw, or stomach. ? Shortness of breath with or without chest discomfort. ? Other signs may include breaking out in a cold sweat, nausea, or lightheadedness. Don't wait more than five minutes to call [address]! Fast action can save your life. Calling 911 is almost always the fastest way to get lifesaving treatment.  Emergency Medical Services staff can begin treatment when they arrive  up to an hour sooner than if someone gets to the hospital by car. The discharge information has been reviewed with the patient. The patient verbalized understanding. Discharge medications reviewed with the patient and appropriate educational materials and side effects teaching were provided. Patient armband removed and given to patient to take home. Patient was informed of the privacy risks if armband lost or stolen Introducing [facility]! Dear Meaghan Pelletier: 
Thank you for requesting a Rollbar account. Our records indicate that you already have an active Rollbar account. You can access your account anytime at [email or web address] Did you know that you can access your hospital and ER discharge instructions at any time in Rollbar? You can also review all of your test results from your hospital stay or ER visit. Additional Information If you have questions, please visit the Frequently Asked Questions section of the Rollbar website at [email or web address] Remember, Rollbar is NOT to be used for urgent needs. For medical emergencies, dial 911. Now available from your iPhone and Android! Providers Seen During Your Hospitalization Provider Specialty Primary office phone Zachary Shields MD Surgery [phone] Your Primary Care Physician (PCP) Primary Care Physician Office Phone Office Fax Ad Gain [phone] You are allergic to the following Allergen Reactions Doxycycline Anaphylaxis Keflex (Cephalexin) Anaphylaxis Pcn (Penicillins) Hives Recent Documentation Height Weight BMI Smoking Status 1.803 m 96.6 kg 29.71 kg/m2 Never Smoker Emergency Contacts Name Discharge Info Relation Home Work Mobile [address] CAREGIVER [3] Spouse [3]   [phone] Patient Belongings The following personal items are in your possession at time of discharge: 
  Dental Appliances: None  Visual Aid: Glasses   Hearing Aids/Status: Functioning, Right, Left, Other (comment) (to be left with wife waiting )  Home Medications: None   Jewelry: None  Clothing: Undergarments, Sweater, Socks, Footwear, Pants, Shirt    Other Valuables: None Please provide this summary of care documentation to your next provider. Signatures-by signing, you are acknowledging that this After Visit Summary has been reviewed with you and you have received a copy. Patient Signature:  ____________________________________________________________ Date:  ____________________________________________________________  
  
Diagonal Favorite Provider Signature:  ____________________________________________________________ Date:  ____________________________________________________________

## 2018-01-30 NOTE — PERIOP NOTES
Discharge info was given to pt and wife, Sharmila Carrera, both verbalized understanding no signature pen pad not working

## 2018-01-30 NOTE — DISCHARGE INSTRUCTIONS
Instructions Following Ambulatory Surgery    Patient: Padma Nieto MRN: [id number]  SSN: [id number]    YOB: 1960  Age: 61 y.o. Sex: male      Activity  · As tolerated, walking encourage, stairs are okay  · Avoid strenuous activities - no lifting anything heavier than 15 pounds. · You may shower at home after 48 hours. Diet  · Regular diet after nausea from the anesthetic has passed. Pain  · Take pain medication as directed by your doctor  ·  Call your doctor if pain is NOT relieved by medication    Dressing Care  · Remove outer dressing (if any) after 48 hours. Leave steri-strips (if any) in place until they fall off. After Anesthesia  · For the first 24 hours: DO NOT Drive, Drink alcoholic beverages, or Make important decisions  · Be aware of dizziness following anesthesia and while taking pain medication    Call your doctor if  · Excessive bleeding that does not stop after holding mild pressure over the area  · Temperature of 101 degrees F or above  · Redness,excessive swelling or bruising, and/or green or yellow, smelly discharge from incision  · If nausea and vomiting continues    Follow-Up Phone Calls  · Call the office at [phone] to make your follow-up appointment    Appointment date/time: 2 weeks after the surgery. Dr. Eliel Roldan cell phone number is [phone]. Please call me if you have any concerns or questions. DISCHARGE SUMMARY from Nurse    PATIENT INSTRUCTIONS:    After general anesthesia or intravenous sedation, for 24 hours or while taking prescription Narcotics:  · Limit your activities  · Do not drive and operate hazardous machinery  · Do not make important personal or business decisions  · Do  not drink alcoholic beverages  · If you have not urinated within 8 hours after discharge, please contact your surgeon on call.     Report the following to your surgeon:  · Excessive pain, swelling, redness or odor of or around the surgical area  · Temperature over 100.5  · Nausea and vomiting lasting longer than 4 hours or if unable to take medications  · Any signs of decreased circulation or nerve impairment to extremity: change in color, persistent  numbness, tingling, coldness or increase pain  · Any questions    What to do at Home:  No smoking/ No tobacco products/ Avoid exposure to second hand smoke  Surgeon General's Warning:  Quitting smoking now greatly reduces serious risk to your health. Obesity, smoking, and sedentary lifestyle greatly increases your risk for illness    A healthy diet, regular physical exercise & weight monitoring are important for maintaining a healthy lifestyle    You may be retaining fluid if you have a history of heart failure or if you experience any of the following symptoms:  Weight gain of 3 pounds or more overnight or 5 pounds in a week, increased swelling in our hands or feet or shortness of breath while lying flat in bed. Please call your doctor as soon as you notice any of these symptoms; do not wait until your next office visit. Recognize signs and symptoms of STROKE:    F-face looks uneven    A-arms unable to move or move unevenly    S-speech slurred or non-existent    T-time-call 911 as soon as signs and symptoms begin-DO NOT go       Back to bed or wait to see if you get better-TIME IS BRAIN. Warning Signs of HEART ATTACK     Call 911 if you have these symptoms:   Chest discomfort. Most heart attacks involve discomfort in the center of the chest that lasts more than a few minutes, or that goes away and comes back. It can feel like uncomfortable pressure, squeezing, fullness, or pain.  Discomfort in other areas of the upper body. Symptoms can include pain or discomfort in one or both arms, the back, neck, jaw, or stomach.  Shortness of breath with or without chest discomfort.  Other signs may include breaking out in a cold sweat, nausea, or lightheadedness.   Don't wait more than five minutes to call [address]! Fast action can save your life. Calling 911 is almost always the fastest way to get lifesaving treatment. Emergency Medical Services staff can begin treatment when they arrive -- up to an hour sooner than if someone gets to the hospital by car. The discharge information has been reviewed with the patient. The patient verbalized understanding. Discharge medications reviewed with the patient and appropriate educational materials and side effects teaching were provided. Patient armband removed and given to patient to take home.   Patient was informed of the privacy risks if armband lost or stolen

## 2018-02-08 ENCOUNTER — PATIENT MESSAGE (OUTPATIENT)
Dept: FAMILY MEDICINE CLINIC | Age: 58
End: 2018-02-08

## 2018-02-09 ENCOUNTER — OFFICE VISIT (OUTPATIENT)
Dept: SURGERY | Age: 58
End: 2018-02-09

## 2018-02-09 VITALS
HEART RATE: 61 BPM | HEIGHT: 71 IN | WEIGHT: 214.8 LBS | DIASTOLIC BLOOD PRESSURE: 76 MMHG | RESPIRATION RATE: 18 BRPM | SYSTOLIC BLOOD PRESSURE: 110 MMHG | BODY MASS INDEX: 30.07 KG/M2 | TEMPERATURE: 97.7 F | OXYGEN SATURATION: 97 %

## 2018-02-09 DIAGNOSIS — Z09 POSTOPERATIVE EXAMINATION: Primary | ICD-10-CM

## 2018-02-09 RX ORDER — ESOMEPRAZOLE MAGNESIUM 40 MG/1
40 CAPSULE, DELAYED RELEASE ORAL DAILY
Qty: 30 CAP | Refills: 0 | Status: SHIPPED | OUTPATIENT
Start: 2018-02-09 | End: 2018-02-21 | Stop reason: SDUPTHER

## 2018-02-09 RX ORDER — AMLODIPINE BESYLATE 5 MG/1
5 TABLET ORAL DAILY
Qty: 30 TAB | Refills: 0 | Status: SHIPPED | OUTPATIENT
Start: 2018-02-09 | End: 2018-02-21 | Stop reason: SDUPTHER

## 2018-02-09 RX ORDER — METOPROLOL SUCCINATE 100 MG/1
100 TABLET, EXTENDED RELEASE ORAL DAILY
Qty: 30 TAB | Refills: 0 | Status: SHIPPED | OUTPATIENT
Start: 2018-02-09 | End: 2018-02-21 | Stop reason: SDUPTHER

## 2018-02-09 RX ORDER — PRAVASTATIN SODIUM 20 MG/1
20 TABLET ORAL
Qty: 30 TAB | Refills: 0 | Status: SHIPPED | OUTPATIENT
Start: 2018-02-09 | End: 2018-02-21 | Stop reason: SDUPTHER

## 2018-02-09 NOTE — PATIENT INSTRUCTIONS
If you have any questions or concerns about today's appointment, the verbal and/or written instructions you were given for follow up care, please call our office at [phone].     [facility] - [address]    [phone] office  [phone]

## 2018-02-09 NOTE — PROGRESS NOTES
Patient seen and examined. He is doing great. The wound is healing well. Pathology showed lipoma. Follow-up as needed.

## 2018-02-09 NOTE — PROGRESS NOTES
Beth Villa is a 61 y.o. male who presents today for a post-op exam for an excision of a left shoulder mass performed on 01/30/18. Patient scores their post-op pain level on a pain scale from 1-10 as a 0 today. 1. Have you been to the ER, urgent care clinic since your last visit? Hospitalized since your last visit? No    2. Have you seen or consulted any other health care providers outside of the [address] [place] [address] since your last visit? Include any pap smears or colon screening.  No

## 2018-02-09 NOTE — TELEPHONE ENCOUNTER
From: [facility]  To: Jose Roberto Naranjo MD  Sent: 2/8/2018 7:14 PM EST  Subject: Referral Request    Please refill my Norvasc, Toprol, nexium and pravastatin. I fly to [place] on Sunday and need to pick these up on Saturday. Thanks.     Aimee Pierre

## 2018-02-21 ENCOUNTER — OFFICE VISIT (OUTPATIENT)
Dept: FAMILY MEDICINE CLINIC | Age: 58
End: 2018-02-21

## 2018-02-21 ENCOUNTER — HOSPITAL ENCOUNTER (OUTPATIENT)
Dept: LAB | Age: 58
Discharge: HOME | End: 2018-02-21
Payer: COMMERCIAL

## 2018-02-21 VITALS
WEIGHT: 218 LBS | DIASTOLIC BLOOD PRESSURE: 80 MMHG | RESPIRATION RATE: 16 BRPM | TEMPERATURE: 98 F | HEIGHT: 71 IN | BODY MASS INDEX: 30.52 KG/M2 | HEART RATE: 58 BPM | SYSTOLIC BLOOD PRESSURE: 138 MMHG | OXYGEN SATURATION: 96 %

## 2018-02-21 DIAGNOSIS — Z11.59 NEED FOR HEPATITIS C SCREENING TEST: ICD-10-CM

## 2018-02-21 DIAGNOSIS — I10 ESSENTIAL HYPERTENSION: ICD-10-CM

## 2018-02-21 DIAGNOSIS — K21.9 GASTROESOPHAGEAL REFLUX DISEASE WITHOUT ESOPHAGITIS: ICD-10-CM

## 2018-02-21 DIAGNOSIS — E78.01 FAMILIAL HYPERCHOLESTEROLEMIA: ICD-10-CM

## 2018-02-21 DIAGNOSIS — K21.9 GASTROESOPHAGEAL REFLUX DISEASE WITHOUT ESOPHAGITIS: Primary | ICD-10-CM

## 2018-02-21 LAB
ALBUMIN SERPL-MCNC: 4.2 G/DL (ref 3.4–5)
ALBUMIN/GLOB SERPL: 1.5 {RATIO} (ref 0.8–1.7)
ALP SERPL-CCNC: 57 U/L (ref 45–117)
ALT SERPL-CCNC: 50 U/L (ref 16–61)
ANION GAP SERPL CALC-SCNC: 10 MMOL/L (ref 3–18)
APPEARANCE UR: CLEAR
AST SERPL-CCNC: 22 U/L (ref 15–37)
BASOPHILS # BLD: 0 K/UL (ref 0–0.06)
BASOPHILS NFR BLD: 0 % (ref 0–2)
BILIRUB SERPL-MCNC: 0.5 MG/DL (ref 0.2–1)
BILIRUB UR QL: NEGATIVE
BUN SERPL-MCNC: 20 MG/DL (ref 7–18)
BUN/CREAT SERPL: 20 (ref 12–20)
CALCIUM SERPL-MCNC: 8.7 MG/DL (ref 8.5–10.1)
CHLORIDE SERPL-SCNC: 104 MMOL/L (ref 100–108)
CHOLEST SERPL-MCNC: 153 MG/DL
CO2 SERPL-SCNC: 24 MMOL/L (ref 21–32)
COLOR UR: YELLOW
CREAT SERPL-MCNC: 0.99 MG/DL (ref 0.6–1.3)
DIFFERENTIAL METHOD BLD: ABNORMAL
EOSINOPHIL # BLD: 0.2 K/UL (ref 0–0.4)
EOSINOPHIL NFR BLD: 3 % (ref 0–5)
ERYTHROCYTE [DISTWIDTH] IN BLOOD BY AUTOMATED COUNT: 12.9 % (ref 11.6–14.5)
GLOBULIN SER CALC-MCNC: 2.8 G/DL (ref 2–4)
GLUCOSE SERPL-MCNC: 95 MG/DL (ref 74–99)
GLUCOSE UR STRIP.AUTO-MCNC: NEGATIVE MG/DL
HCT VFR BLD AUTO: 44 % (ref 36–48)
HDLC SERPL-MCNC: 46 MG/DL (ref 40–60)
HDLC SERPL: 3.3 {RATIO} (ref 0–5)
HGB BLD-MCNC: 14.6 G/DL (ref 13–16)
HGB UR QL STRIP: NEGATIVE
KETONES UR QL STRIP.AUTO: NEGATIVE MG/DL
LDLC SERPL CALC-MCNC: 73.8 MG/DL (ref 0–100)
LEUKOCYTE ESTERASE UR QL STRIP.AUTO: NEGATIVE
LIPID PROFILE,FLP: ABNORMAL
LYMPHOCYTES # BLD: 2.5 K/UL (ref 0.9–3.6)
LYMPHOCYTES NFR BLD: 36 % (ref 21–52)
MCH RBC QN AUTO: 29.9 PG (ref 24–34)
MCHC RBC AUTO-ENTMCNC: 33.2 G/DL (ref 31–37)
MCV RBC AUTO: 90.2 FL (ref 74–97)
MONOCYTES # BLD: 0.7 K/UL (ref 0.05–1.2)
MONOCYTES NFR BLD: 11 % (ref 3–10)
NEUTS SEG # BLD: 3.5 K/UL (ref 1.8–8)
NEUTS SEG NFR BLD: 50 % (ref 40–73)
NITRITE UR QL STRIP.AUTO: NEGATIVE
PH UR STRIP: 6 [PH] (ref 5–8)
PLATELET # BLD AUTO: 193 K/UL (ref 135–420)
PMV BLD AUTO: 11.7 FL (ref 9.2–11.8)
POTASSIUM SERPL-SCNC: 4.1 MMOL/L (ref 3.5–5.5)
PROT SERPL-MCNC: 7 G/DL (ref 6.4–8.2)
PROT UR STRIP-MCNC: NEGATIVE MG/DL
RBC # BLD AUTO: 4.88 M/UL (ref 4.7–5.5)
SODIUM SERPL-SCNC: 138 MMOL/L (ref 136–145)
SP GR UR REFRACTOMETRY: 1.02 (ref 1–1.03)
TRIGL SERPL-MCNC: 166 MG/DL (ref ?–150)
UROBILINOGEN UR QL STRIP.AUTO: 0.2 EU/DL (ref 0.2–1)
VLDLC SERPL CALC-MCNC: 33.2 MG/DL
WBC # BLD AUTO: 6.9 K/UL (ref 4.6–13.2)

## 2018-02-21 PROCEDURE — 81003 URINALYSIS AUTO W/O SCOPE: CPT | Performed by: INTERNAL MEDICINE

## 2018-02-21 PROCEDURE — 80053 COMPREHEN METABOLIC PANEL: CPT | Performed by: INTERNAL MEDICINE

## 2018-02-21 PROCEDURE — 36415 COLL VENOUS BLD VENIPUNCTURE: CPT | Performed by: INTERNAL MEDICINE

## 2018-02-21 PROCEDURE — 80061 LIPID PANEL: CPT | Performed by: INTERNAL MEDICINE

## 2018-02-21 PROCEDURE — 86803 HEPATITIS C AB TEST: CPT | Performed by: INTERNAL MEDICINE

## 2018-02-21 PROCEDURE — 85025 COMPLETE CBC W/AUTO DIFF WBC: CPT | Performed by: INTERNAL MEDICINE

## 2018-02-21 RX ORDER — PRAVASTATIN SODIUM 20 MG/1
20 TABLET ORAL
Qty: 90 TAB | Refills: 0 | Status: SHIPPED | OUTPATIENT
Start: 2018-02-21 | End: 2018-07-17 | Stop reason: SDUPTHER

## 2018-02-21 RX ORDER — AMLODIPINE BESYLATE 5 MG/1
5 TABLET ORAL DAILY
Qty: 90 TAB | Refills: 0 | Status: SHIPPED | OUTPATIENT
Start: 2018-02-21 | End: 2018-04-11

## 2018-02-21 RX ORDER — NEBIVOLOL 5 MG/1
5 TABLET ORAL DAILY
Qty: 30 TAB | Refills: 6 | Status: SHIPPED | OUTPATIENT
Start: 2018-02-21 | End: 2018-09-14 | Stop reason: SDUPTHER

## 2018-02-21 RX ORDER — RAMIPRIL 5 MG/1
5 CAPSULE ORAL DAILY
Qty: 30 CAP | Refills: 6 | Status: SHIPPED | OUTPATIENT
Start: 2018-02-21 | End: 2018-05-15

## 2018-02-21 RX ORDER — ESOMEPRAZOLE MAGNESIUM 40 MG/1
40 CAPSULE, DELAYED RELEASE ORAL DAILY
Qty: 90 CAP | Refills: 0 | Status: SHIPPED | OUTPATIENT
Start: 2018-02-21 | End: 2018-07-17 | Stop reason: SDUPTHER

## 2018-02-21 RX ORDER — METOPROLOL SUCCINATE 100 MG/1
100 TABLET, EXTENDED RELEASE ORAL DAILY
Qty: 90 TAB | Refills: 0 | Status: SHIPPED | OUTPATIENT
Start: 2018-02-21 | End: 2018-04-11

## 2018-02-21 NOTE — PROGRESS NOTES
HISTORY OF PRESENT ILLNESS  Edwin Holliday is a 61 y.o. male here to establish care. Patient has history of hypertension and hyperlipidemia. He states when he was first diagnosed with hypertension his systolic blood pressure was 150-160. Overall he is feeling well and has no complaints at this time. .  Hypertension    The history is provided by the patient and medical records. This is a chronic problem. The problem has not changed since onset. Pertinent negatives include no chest pain, no orthopnea, no headaches, no peripheral edema, no dizziness and no shortness of breath. There are no associated agents to hypertension. Risk factors include male gender. Cholesterol Problem   The history is provided by the patient and medical records. This is a chronic problem. The problem has been gradually improving. Pertinent negatives include no chest pain, no abdominal pain, no headaches and no shortness of breath. The symptoms are aggravated by eating. The symptoms are relieved by medications. Treatments tried: Pravachol. The treatment provided significant relief. Other   The history is provided by the medical records (Patient is in need of both hepatitis C screen and colon cancer screening). This is a new problem. Pertinent negatives include no chest pain, no abdominal pain, no headaches and no shortness of breath. Allergies   Allergen Reactions    Doxycycline Anaphylaxis    Keflex [Cephalexin] Anaphylaxis    Pcn [Penicillins] Hives     Current Outpatient Prescriptions on File Prior to Visit   Medication Sig Dispense Refill    tolterodine ER (DETROL LA) 4 mg ER capsule Take 1 Cap by mouth daily. 90 Cap 3    raNITIdine (ZANTAC) 150 mg tablet Take 150 mg by mouth nightly.  aspirin delayed-release 81 mg tablet Take 81 mg by mouth daily.  lysine (L-LYSINE) 500 mg tab tablet Take 500 mg by mouth daily.  Bifidobacterium Infantis (ALIGN) 4 mg cap Take 4 mg by mouth daily.       methylcellulose (CITRUCEL) 500 mg tablet Take 1,000 mg by mouth nightly.  multivitamin (MEN'S MULTI-VITAMIN) tablet Take 1 Tab by mouth daily.  oxyCODONE-acetaminophen (PERCOCET) 5-325 mg per tablet Take 1 Tab by mouth every four (4) hours as needed for Pain. Max Daily Amount: 6 Tabs. 24 Tab 0     No current facility-administered medications on file prior to visit. Past Medical History:   Diagnosis Date    Chronic pain     Joint pain    GERD (gastroesophageal reflux disease)     Hypertension     IBS (irritable bowel syndrome)      Past Surgical History:   Procedure Laterality Date    HX ACL RECONSTRUCTION Right     knee    HX ACL RECONSTRUCTION Bilateral     HX HEENT      cyst on jaw removed    HX ORTHOPAEDIC      HX UROLOGICAL      vasectomy     Family History   Problem Relation Age of Onset    Lupus Mother     Heart Disease Father     Hypertension Father     Cancer Father      . Social History     Social History    Marital status:      Spouse name: N/A    Number of children: N/A    Years of education: N/A     Occupational History    Not on file. Social History Main Topics    Smoking status: Never Smoker    Smokeless tobacco: Never Used    Alcohol use Yes      Comment: occasionally    Drug use: No    Sexual activity: Yes     Partners: Female     Birth control/ protection: None     Other Topics Concern    Not on file     Social History Narrative       Review of Systems   Constitutional: Negative. Eyes: Negative. Respiratory: Negative. Negative for shortness of breath. Cardiovascular: Negative. Negative for chest pain and orthopnea. Gastrointestinal: Negative for abdominal pain. Musculoskeletal: Negative. Neurological: Negative. Negative for dizziness and headaches. Endo/Heme/Allergies: Negative. Psychiatric/Behavioral: Negative.       Visit Vitals    /80 (BP 1 Location: Right arm, BP Patient Position: Sitting)    Pulse (!) 58    Temp 98 °F (36.7 °C) (Oral)    Resp 16    Ht 5' 11\" (1.803 m)    Wt 218 lb (98.9 kg)    SpO2 96%    BMI 30.4 kg/m2       Physical Exam   Constitutional: He is oriented to person, place, and time. He appears well-developed and well-nourished. HENT:   Head: Normocephalic and atraumatic. Cardiovascular: Normal rate, regular rhythm, normal heart sounds and intact distal pulses. Exam reveals no gallop and no friction rub. No murmur heard. Pulmonary/Chest: Effort normal and breath sounds normal. No respiratory distress. He has no wheezes. He has no rales. Musculoskeletal: Normal range of motion. He exhibits no edema or tenderness. Neurological: He is alert and oriented to person, place, and time. No cranial nerve deficit. Coordination normal.   Skin: Skin is warm and dry. No rash noted. No erythema. No pallor. Psychiatric: He has a normal mood and affect. His behavior is normal. Thought content normal.   Nursing note and vitals reviewed. ASSESSMENT and PLAN    ICD-10-CM ICD-9-CM    1. Gastroesophageal reflux disease without esophagitis K21.9 530.81 CBC WITH AUTOMATED DIFF   2. Familial hypercholesterolemia E78.01 272.0 LIPID PANEL      METABOLIC PANEL, COMPREHENSIVE   3. Essential hypertension Q33 322.7 METABOLIC PANEL, COMPREHENSIVE      URINALYSIS W/ RFLX MICROSCOPIC   4. Need for hepatitis C screening test Z11.59 V73.89 HEPATITIS C AB     Follow-up Disposition:  Return in about 4 weeks (around 3/21/2018).

## 2018-02-21 NOTE — PROGRESS NOTES
Buddy Cardozo is a 61 y.o. male presents to office for Medication refills        Health Maintenance items with a due date reviewed with patient:  Health Maintenance Due   Topic Date Due    Hepatitis C Screening  1960

## 2018-02-22 LAB
HCV AB SER IA-ACNC: 0.09 INDEX
HCV AB SERPL QL IA: NEGATIVE
HCV COMMENT,HCGAC: NORMAL

## 2018-04-11 ENCOUNTER — OFFICE VISIT (OUTPATIENT)
Dept: FAMILY MEDICINE CLINIC | Age: 58
End: 2018-04-11

## 2018-04-11 VITALS
DIASTOLIC BLOOD PRESSURE: 60 MMHG | HEIGHT: 71 IN | OXYGEN SATURATION: 96 % | HEART RATE: 69 BPM | SYSTOLIC BLOOD PRESSURE: 112 MMHG | WEIGHT: 222.8 LBS | RESPIRATION RATE: 16 BRPM | TEMPERATURE: 96 F | BODY MASS INDEX: 31.19 KG/M2

## 2018-04-11 DIAGNOSIS — Z12.11 SCREENING FOR COLON CANCER: ICD-10-CM

## 2018-04-11 DIAGNOSIS — Z91.09 ENVIRONMENTAL ALLERGIES: ICD-10-CM

## 2018-04-11 DIAGNOSIS — R05.8 COUGH DUE TO ACE INHIBITOR: ICD-10-CM

## 2018-04-11 DIAGNOSIS — T46.4X5A COUGH DUE TO ACE INHIBITOR: ICD-10-CM

## 2018-04-11 DIAGNOSIS — I10 ESSENTIAL HYPERTENSION: Primary | ICD-10-CM

## 2018-04-11 RX ORDER — LOSARTAN POTASSIUM 25 MG/1
25 TABLET ORAL DAILY
Qty: 30 TAB | Refills: 6 | Status: SHIPPED | OUTPATIENT
Start: 2018-04-11 | End: 2018-07-17 | Stop reason: SDUPTHER

## 2018-04-11 NOTE — PROGRESS NOTES
HISTORY OF PRESENT ILLNESS  Nancy Ruiz is a 61 y.o. male here for follow-up on hypertension. Patient states that he is feeling well on new medications except that he has developed a cough. He also has history of seasonal allergies and has symptoms now. .  Hypertension    The history is provided by the patient. This is a new problem. The problem has been rapidly improving. Pertinent negatives include no chest pain, no orthopnea, no headaches, no peripheral edema, no dizziness and no shortness of breath. There are no associated agents to hypertension. Risk factors include male gender. Allergies   The history is provided by the patient. This is a recurrent problem. The problem has been gradually worsening. Pertinent negatives include no chest pain, no abdominal pain, no headaches and no shortness of breath. Exacerbated by: Exposure to pollen. Nothing relieves the symptoms. He has tried nothing for the symptoms. Cough   The history is provided by the patient. This is a new problem. The problem has been gradually worsening. Pertinent negatives include no chest pain, no abdominal pain, no headaches and no shortness of breath. Exacerbated by: Rohith Ra. Nothing relieves the symptoms. He has tried nothing for the symptoms. Allergies   Allergen Reactions    Doxycycline Anaphylaxis    Keflex [Cephalexin] Anaphylaxis    Pcn [Penicillins] Hives     Current Outpatient Prescriptions on File Prior to Visit   Medication Sig Dispense Refill    pravastatin (PRAVACHOL) 20 mg tablet Take 1 Tab by mouth nightly. 90 Tab 0    esomeprazole (NEXIUM) 40 mg capsule Take 1 Cap by mouth daily. 90 Cap 0    nebivolol (BYSTOLIC) 5 mg tablet Take 1 Tab by mouth daily. 30 Tab 6    ramipril (ALTACE) 5 mg capsule Take 1 Cap by mouth daily. 30 Cap 6    tolterodine ER (DETROL LA) 4 mg ER capsule Take 1 Cap by mouth daily. 90 Cap 3    raNITIdine (ZANTAC) 150 mg tablet Take 150 mg by mouth nightly.       aspirin delayed-release 81 mg tablet Take 81 mg by mouth daily.  lysine (L-LYSINE) 500 mg tab tablet Take 500 mg by mouth daily.  Bifidobacterium Infantis (ALIGN) 4 mg cap Take 4 mg by mouth daily.  methylcellulose (CITRUCEL) 500 mg tablet Take 1,000 mg by mouth nightly.  multivitamin (MEN'S MULTI-VITAMIN) tablet Take 1 Tab by mouth daily. No current facility-administered medications on file prior to visit. Past Medical History:   Diagnosis Date    Chronic pain     Joint pain    GERD (gastroesophageal reflux disease)     Hypertension     IBS (irritable bowel syndrome)      Past Surgical History:   Procedure Laterality Date    HX ACL RECONSTRUCTION Right     knee    HX ACL RECONSTRUCTION Bilateral     HX HEENT      cyst on jaw removed    HX ORTHOPAEDIC      HX UROLOGICAL      vasectomy     Family History   Problem Relation Age of Onset    Lupus Mother     Heart Disease Father     Hypertension Father     Cancer Father      Social History     Social History    Marital status:      Spouse name: N/A    Number of children: N/A    Years of education: N/A     Occupational History    Not on file. Social History Main Topics    Smoking status: Never Smoker    Smokeless tobacco: Never Used    Alcohol use Yes      Comment: occasionally    Drug use: No    Sexual activity: Yes     Partners: Female     Birth control/ protection: None     Other Topics Concern    Not on file     Social History Narrative       Review of Systems   Constitutional: Negative. Eyes: Negative. Respiratory: Positive for cough. Negative for shortness of breath. Cardiovascular: Negative. Negative for chest pain and orthopnea. Gastrointestinal: Negative for abdominal pain. Musculoskeletal: Negative. Neurological: Negative. Negative for dizziness and headaches. Endo/Heme/Allergies: Negative. Psychiatric/Behavioral: Negative.       Visit Vitals    /60 (BP 1 Location: Right arm, BP Patient Position: Sitting)    Pulse 69    Temp 96 °F (35.6 °C) (Oral)    Resp 16    Ht 5' 11\" (1.803 m)    Wt 222 lb 12.8 oz (101.1 kg)    SpO2 96%    BMI 31.07 kg/m2       Physical Exam   Constitutional: He is oriented to person, place, and time. He appears well-developed and well-nourished. HENT:   Head: Normocephalic and atraumatic. Cardiovascular: Normal rate, regular rhythm, normal heart sounds and intact distal pulses. Exam reveals no gallop and no friction rub. No murmur heard. Pulmonary/Chest: Effort normal and breath sounds normal. No respiratory distress. He has no wheezes. He has no rales. Musculoskeletal: Normal range of motion. He exhibits no edema or tenderness. Neurological: He is alert and oriented to person, place, and time. No cranial nerve deficit. Coordination normal.   Skin: Skin is warm and dry. No rash noted. No erythema. No pallor. Psychiatric: He has a normal mood and affect. His behavior is normal. Thought content normal.   Nursing note and vitals reviewed. ASSESSMENT and PLAN    ICD-10-CM ICD-9-CM    1. Essential hypertension I10 401.9    2. Cough due to ACE inhibitor R05 786.2     T46.4X5A E942.6    3. Screening for colon cancer Z12.11 V76.51 OCCULT BLOOD, IMMUNOASSAY (FIT)   4. Environmental allergies Z91.09 V15.09      Follow-up Disposition:  Return in about 4 weeks (around 5/9/2018). Patient has been instructed to use Zyrtec without decongestant as needed for allergies.

## 2018-04-11 NOTE — MR AVS SNAPSHOT
[facility] 
 
 
 [address] [place] [address] 
[phone] Patient: Starla Mojica MRN: [id number] TTU:[birth year] Visit Information Date & Time Provider Department Dept. Phone Encounter #  
 4/11/2018 10:00 AM Joellen Gómez MD [facility] [phone] [id number] Follow-up Instructions Return in about 4 weeks (around 5/9/2018). Follow-up and Disposition History Your Appointments 5/15/2018 10:45 AM  
Follow Up with Joellen Gómez MD  
[facility]) Appt Note: 1 month f/u  
 [address] [address]  
[phone]  
  
   
 [address] [address]  
  
    
 11/26/2018  9:30 AM  
ESTABLISHED PATIENT with Karly Matamoros MD  
[facility]) Appt Note: Return in about 1 year (around 11/27/2018). [address] [place], [place] [address]  
[phone]  
  
   
 [place] [address] Upcoming Health Maintenance Date Due FOBT Q 1 YEAR AGE 50-75 11/30/2009 DTaP/Tdap/Td series (2 - Td) 7/17/2027 Allergies as of 4/11/2018  Review Complete On: 4/11/2018 By: Joellen Gómez MD  
  
 Severity Noted Reaction Type Reactions Doxycycline High 07/17/2017    Anaphylaxis Keflex [Cephalexin] High 07/17/2017    Anaphylaxis Pcn [Penicillins]  07/17/2017    Hives Current Immunizations  Reviewed on 9/15/2017 Name Date Influenza Vaccine (Quad) PF 9/15/2017 Not reviewed this visit You Were Diagnosed With   
  
 Codes Comments Essential hypertension    -  Primary ICD-10-CM: I10 
ICD-9-CM: 401.9 Cough due to ACE inhibitor     ICD-10-CM: R05, T46.4X5A 
ICD-9-CM: 786.2, E942.6 Screening for colon cancer     ICD-10-CM: Z12.11 ICD-9-CM: V76.51   
 Environmental allergies     ICD-10-CM: Z91.09 
ICD-9-CM: V15.09 Vitals BP Pulse Temp Resp Height(growth percentile) Weight(growth percentile) 112/60 (BP 1 Location: Right arm, BP Patient Position: Sitting) 69 96 °F (35.6 °C) (Oral) 16 5' 11\" (1.803 m) 222 lb 12.8 oz (101.1 kg) SpO2 BMI Smoking Status 96% 31.07 kg/m2 Never Smoker Vitals History BMI and BSA Data Body Mass Index Body Surface Area 31.07 kg/m 2 2.25 m 2 Preferred Pharmacy Pharmacy Name Phone [phone] Your Updated Medication List  
  
   
This list is accurate as of 4/11/18  3:17 PM.  Always use your most recent med list.  
  
  
  
  
 ALIGN 4 mg Cap Generic drug:  Bifidobacterium Infantis Take 4 mg by mouth daily. aspirin delayed-release 81 mg tablet Take 81 mg by mouth daily. CITRUCEL 500 mg tablet Generic drug:  methylcellulose Take 1,000 mg by mouth nightly. esomeprazole 40 mg capsule Commonly known as:  Gaye Moulds Take 1 Cap by mouth daily. L-LYSINE 500 mg Tab tablet Generic drug:  lysine Take 500 mg by mouth daily. losartan 25 mg tablet Commonly known as:  COZAAR Take 1 Tab by mouth daily. MEN'S MULTI-VITAMIN tablet Generic drug:  multivitamin Take 1 Tab by mouth daily. nebivolol 5 mg tablet Commonly known as:  BYSTOLIC Take 1 Tab by mouth daily. pravastatin 20 mg tablet Commonly known as:  PRAVACHOL Take 1 Tab by mouth nightly. ramipril 5 mg capsule Commonly known as:  ALTACE Take 1 Cap by mouth daily. tolterodine ER 4 mg ER capsule Commonly known as:  Juvencio Marsh Take 1 Cap by mouth daily. ZANTAC 150 mg tablet Generic drug:  raNITIdine Take 150 mg by mouth nightly. Prescriptions Sent to Pharmacy  Refills  
 losartan (COZAAR) 25 mg tablet 6  
 Sig: Take 1 Tab by mouth daily. Class: Normal  
 Pharmacy: [facility]  #: [phone] Route: Oral  
  
Follow-up Instructions Return in about 4 weeks (around 5/9/2018). Introducing [facility]! Dear Chace Peterson: 
Thank you for requesting a [employer] account. Our records indicate that you already have an active Park[employer] account. You can access your account anytime at [email or web address] Did you know that you can access your hospital and ER discharge instructions at any time in Parkya? You can also review all of your test results from your hospital stay or ER visit. Additional Information If you have questions, please visit the Frequently Asked Questions section of the Parkya website at [email or web address] Remember, Parkya is NOT to be used for urgent needs. For medical emergencies, dial 911. Now available from your iPhone and Android! Please provide this summary of care documentation to your next provider. Your primary care clinician is listed as Magalys Holden. If you have any questions after today's visit, please call [phone].

## 2018-04-11 NOTE — PROGRESS NOTES
Marj Briones is a 61 y.o. male presents to office for follow up and medication evaluation         Health Maintenance items with a due date reviewed with patient:  Health Maintenance Due   Topic Date Due    FOBT Q 1 YEAR AGE 50-75  11/30/2009

## 2018-04-17 ENCOUNTER — HOSPITAL ENCOUNTER (OUTPATIENT)
Dept: LAB | Age: 58
Discharge: HOME | End: 2018-04-17
Payer: COMMERCIAL

## 2018-04-17 DIAGNOSIS — Z12.11 SCREENING FOR COLON CANCER: ICD-10-CM

## 2018-04-17 PROCEDURE — 82274 ASSAY TEST FOR BLOOD FECAL: CPT | Performed by: INTERNAL MEDICINE

## 2018-04-20 LAB — HEMOCCULT STL QL IA: NEGATIVE

## 2018-05-16 ENCOUNTER — TELEPHONE (OUTPATIENT)
Dept: FAMILY MEDICINE CLINIC | Age: 58
End: 2018-05-16

## 2018-05-16 NOTE — TELEPHONE ENCOUNTER
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 He needs referral to hand surgeon.

## 2018-06-20 RX ORDER — PRAVASTATIN SODIUM 20 MG/1
TABLET ORAL
Qty: 30 TAB | Refills: 0 | Status: SHIPPED | OUTPATIENT
Start: 2018-06-20 | End: 2018-07-17 | Stop reason: ALTCHOICE

## 2018-07-17 ENCOUNTER — OFFICE VISIT (OUTPATIENT)
Dept: FAMILY MEDICINE CLINIC | Age: 58
End: 2018-07-17

## 2018-07-17 VITALS
TEMPERATURE: 96.6 F | DIASTOLIC BLOOD PRESSURE: 66 MMHG | HEART RATE: 62 BPM | RESPIRATION RATE: 16 BRPM | WEIGHT: 222.2 LBS | HEIGHT: 71 IN | SYSTOLIC BLOOD PRESSURE: 122 MMHG | BODY MASS INDEX: 31.11 KG/M2 | OXYGEN SATURATION: 96 %

## 2018-07-17 DIAGNOSIS — R07.9 CHEST PAIN, UNSPECIFIED TYPE: Primary | ICD-10-CM

## 2018-07-17 DIAGNOSIS — E78.01 FAMILIAL HYPERCHOLESTEROLEMIA: ICD-10-CM

## 2018-07-17 DIAGNOSIS — K21.9 GASTROESOPHAGEAL REFLUX DISEASE WITHOUT ESOPHAGITIS: ICD-10-CM

## 2018-07-17 DIAGNOSIS — I10 ESSENTIAL HYPERTENSION: ICD-10-CM

## 2018-07-17 RX ORDER — LOSARTAN POTASSIUM 25 MG/1
25 TABLET ORAL DAILY
Qty: 30 TAB | Refills: 6 | Status: SHIPPED | OUTPATIENT
Start: 2018-07-17 | End: 2019-04-16 | Stop reason: SDUPTHER

## 2018-07-17 RX ORDER — TOLTERODINE 4 MG/1
4 CAPSULE, EXTENDED RELEASE ORAL DAILY
Qty: 90 CAP | Refills: 3 | Status: CANCELLED | OUTPATIENT
Start: 2018-07-17

## 2018-07-17 RX ORDER — ESOMEPRAZOLE MAGNESIUM 40 MG/1
40 CAPSULE, DELAYED RELEASE ORAL DAILY
Qty: 90 CAP | Refills: 0 | Status: SHIPPED | OUTPATIENT
Start: 2018-07-17 | End: 2018-10-29 | Stop reason: SDUPTHER

## 2018-07-17 RX ORDER — PRAVASTATIN SODIUM 20 MG/1
20 TABLET ORAL
Qty: 90 TAB | Refills: 0 | Status: SHIPPED | OUTPATIENT
Start: 2018-07-17 | End: 2018-10-29 | Stop reason: SDUPTHER

## 2018-07-17 NOTE — PROGRESS NOTES
Marjan Brewer is a 61 y.o. male (: 1960) presenting to address:    Chief Complaint   Patient presents with    Hypertension    GERD    Cholesterol Problem    Back Pain     Right side mid back / scapular pain. > 6 mos       Vitals:    18 1629   BP: 122/66   Pulse: 62   Resp: 16   Temp: 96.6 °F (35.9 °C)   TempSrc: Oral   SpO2: 96%   Weight: 222 lb 3.2 oz (100.8 kg)   Height: 5' 11\" (1.803 m)   PainSc:   0 - No pain       Hearing/Vision:   No exam data present    Learning Assessment:     Learning Assessment 2017   PRIMARY LEARNER Patient   PRIMARY LANGUAGE ENGLISH   LEARNER PREFERENCE PRIMARY READING   ANSWERED BY self   RELATIONSHIP SELF     Depression Screening:     PHQ over the last two weeks 2018   Little interest or pleasure in doing things Not at all   Feeling down, depressed or hopeless Not at all   Total Score PHQ 2 0     Fall Risk Assessment:     Fall Risk Assessment, last 12 mths 9/15/2017   Able to walk? Yes   Fall in past 12 months? No     Abuse Screening:     Abuse Screening Questionnaire 2018   Do you ever feel afraid of your partner? N   Are you in a relationship with someone who physically or mentally threatens you? N   Is it safe for you to go home? Y     Coordination of Care Questionaire:   1. Have you been to the ER, urgent care clinic since your last visit? Hospitalized since your last visit? No    2. Have you seen or consulted any other health care providers outside of the [address] [place] [address] since your last visit? Include any pap smears or colon screening. as in EMR /Care everywhere    Advanced Directive:   1. Do you have an Advanced Directive? No  2. Would you like information on Advanced Directives?  No

## 2018-07-17 NOTE — MR AVS SNAPSHOT
[facility] 
 
 
 120 [facility] [address] [place] [address] 
[phone] Patient: Noah Cha MRN: [id number] FXC:10[birth year] Visit Information Date & Time Provider Department Dept. Phone Encounter #  
 7/17/2018  4:15 PM Laureano Barrios MD [facility] [phone] [id number] Follow-up Instructions Return in about 2 weeks (around 7/31/2018). Follow-up and Disposition History Your Appointments 7/31/2018  4:00 PM  
Follow Up with Laureano Barrios MD  
[facility]) Appt Note: 2wk [address] [address]  
[phone]  
  
   
 [address] [place] [address]  
  
    
 11/26/2018  9:30 AM  
ESTABLISHED PATIENT with Jesus Manuel Vegas MD  
[facility] [address]) Appt Note: Return in about 1 year (around 11/27/2018). [address] [place] [address]  
[phone]  
  
   
 [place] [address] Upcoming Health Maintenance Date Due Influenza Age 5 to Adult 8/1/2018 FOBT Q 1 YEAR AGE 50-75 4/17/2019 DTaP/Tdap/Td series (2 - Td) 7/17/2027 Allergies as of 7/17/2018  Review Complete On: 7/17/2018 By: Laureano Barrios MD  
  
 Severity Noted Reaction Type Reactions Doxycycline High 07/17/2017    Anaphylaxis Keflex [Cephalexin] High 07/17/2017    Anaphylaxis Pcn [Penicillins]  07/17/2017    Hives Current Immunizations  Reviewed on 9/15/2017 Name Date Influenza Vaccine (Quad) PF 9/15/2017 Not reviewed this visit You Were Diagnosed With   
  
 Codes Comments Chest pain, unspecified type    -  Primary ICD-10-CM: R07.9 ICD-9-CM: 786.50 Essential hypertension     ICD-10-CM: I10 
ICD-9-CM: 401.9 Gastroesophageal reflux disease without esophagitis     ICD-10-CM: K21.9 ICD-9-CM: 530.81 Familial hypercholesterolemia     ICD-10-CM: E78.01 
ICD-9-CM: 272.0 Vitals BP Pulse Temp Resp Height(growth percentile) Weight(growth percentile) 122/66 (BP 1 Location: Right arm, BP Patient Position: Sitting) 62 96.6 °F (35.9 °C) (Oral) 16 5' 11\" (1.803 m) 222 lb 3.2 oz (100.8 kg) SpO2 BMI Smoking Status 96% 30.99 kg/m2 Never Smoker Vitals History BMI and BSA Data Body Mass Index Body Surface Area 30.99 kg/m 2 2.25 m 2 Preferred Pharmacy Pharmacy Name Phone [phone] Your Updated Medication List  
  
   
This list is accurate as of 7/17/18  6:07 PM.  Always use your most recent med list.  
  
  
  
  
 ALIGN 4 mg Cap Generic drug:  Bifidobacterium Infantis Take 4 mg by mouth daily. aspirin delayed-release 81 mg tablet Take 81 mg by mouth daily. CITRUCEL 500 mg tablet Generic drug:  methylcellulose Take 1,000 mg by mouth nightly. esomeprazole 40 mg capsule Commonly known as:  Zabrina Bur Take 1 Cap by mouth daily. L-LYSINE 500 mg Tab tablet Generic drug:  lysine Take 500 mg by mouth daily. losartan 25 mg tablet Commonly known as:  COZAAR Take 1 Tab by mouth daily. MEN'S MULTI-VITAMIN tablet Generic drug:  multivitamin Take 1 Tab by mouth daily. nebivolol 5 mg tablet Commonly known as:  BYSTOLIC Take 1 Tab by mouth daily. pravastatin 20 mg tablet Commonly known as:  PRAVACHOL Take 1 Tab by mouth nightly. tolterodine ER 4 mg ER capsule Commonly known as:  Kelvin Luis Daniel Take 1 Cap by mouth daily. ZANTAC 150 mg tablet Generic drug:  raNITIdine Take 150 mg by mouth nightly. Prescriptions Sent to Pharmacy  Refills  
 losartan (COZAAR) 25 mg tablet 6  
 Sig: Take 1 Tab by mouth daily. Class: Normal  
 [facility] Ph #: [phone] Route: Oral  
 pravastatin (PRAVACHOL) 20 mg tablet 0 Sig: Take 1 Tab by mouth nightly. Class: Normal  
 Pharmacy: [facility] Ph #: [phone] Route: Oral  
 esomeprazole (NEXIUM) 40 mg capsule 0 Sig: Take 1 Cap by mouth daily. Class: Normal  
 [facility] Ph #: [phone] Route: Oral  
  
We Performed the Following AMB POC EKG ROUTINE W/ 12 LEADS, INTER & REP [84484 CPT(R)] Follow-up Instructions Return in about 2 weeks (around 7/31/2018). To-Do List   
 07/17/2018 ECHO:  ECHO TTE STRESS COMP W OR WO CONTR   
  
 07/17/2018 ECHO:  ECHO TTE STRESS EXERCISE TREADMILL COMP   
  
 07/17/2018 Imaging:  XR CHEST PA LAT Introducing \[facility] [place] [facility]\"" & [facility] SERVICES! Dear Mark Jackman: 
Thank you for requesting a [email or web address] account. Our records indicate that you already have an active [email or web address] account. You can access your account anytime at [email or web address] Did you know that you can access your hospital and ER discharge instructions at any time in [email or web address]? You can also review all of your test results from your hospital stay or ER visit. Additional Information If you have questions, please visit the Frequently Asked Questions section of the [email or web address] website at [email or web address] Remember, [email or web address] is NOT to be used for urgent needs. For medical emergencies, dial 911. Now available from your iPhone and Android! Please provide this summary of care documentation to your next provider. Your primary care clinician is listed as Stuart Jara. If you have any questions after today's visit, please call [phone].

## 2018-07-17 NOTE — PROGRESS NOTES
HISTORY OF PRESENT ILLNESS  Sandy Britt is a 61 y.o. male here for follow-up of hypertension hyperlipidemia and new onset chest pain. Patient states that chest pain lasts for about 10 minutes and is worsened with deep inspiration. There is no shortness of breath diaphoresis nausea or vomiting  Hypertension    The history is provided by the patient. This is a chronic problem. Associated symptoms include chest pain. Pertinent negatives include no palpitations, no malaise/fatigue, no blurred vision, no dizziness, no shortness of breath and no nausea. There are no associated agents to hypertension. Risk factors include no risk factors. GERD   The history is provided by the patient. This is a recurrent problem. The problem has not changed since onset. Associated symptoms include chest pain. Pertinent negatives include no shortness of breath. Cholesterol Problem   The history is provided by the patient. This is a chronic problem. Associated symptoms include chest pain. Pertinent negatives include no shortness of breath. The symptoms are relieved by medications. Chest Pain    The history is provided by the patient. This is a recurrent problem. The problem has been resolved. The pain is associated with breathing. The pain is at a severity of 5/10. The pain is moderate. The quality of the pain is described as sharp. The pain does not radiate. Pertinent negatives include no claudication, no diaphoresis, no dizziness, no irregular heartbeat, no malaise/fatigue, no nausea, no near-syncope, no palpitations and no shortness of breath. He has tried nothing for the symptoms. Risk factors include dyslipidemia, hypertension and male gender. His past medical history is significant for HTN. Pertinent negatives include no cardiac catheterization, no stress echo and no stress thallium.     Allergies   Allergen Reactions    Doxycycline Anaphylaxis    Keflex [Cephalexin] Anaphylaxis    Pcn [Penicillins] Hives     Current Outpatient Prescriptions on File Prior to Visit   Medication Sig Dispense Refill    losartan (COZAAR) 25 mg tablet Take 1 Tab by mouth daily. 30 Tab 6    pravastatin (PRAVACHOL) 20 mg tablet Take 1 Tab by mouth nightly. 90 Tab 0    esomeprazole (NEXIUM) 40 mg capsule Take 1 Cap by mouth daily. 90 Cap 0    nebivolol (BYSTOLIC) 5 mg tablet Take 1 Tab by mouth daily. 30 Tab 6    tolterodine ER (DETROL LA) 4 mg ER capsule Take 1 Cap by mouth daily. 90 Cap 3    raNITIdine (ZANTAC) 150 mg tablet Take 150 mg by mouth nightly.  aspirin delayed-release 81 mg tablet Take 81 mg by mouth daily.  lysine (L-LYSINE) 500 mg tab tablet Take 500 mg by mouth daily.  Bifidobacterium Infantis (ALIGN) 4 mg cap Take 4 mg by mouth daily.  methylcellulose (CITRUCEL) 500 mg tablet Take 1,000 mg by mouth nightly.  multivitamin (MEN'S MULTI-VITAMIN) tablet Take 1 Tab by mouth daily. No current facility-administered medications on file prior to visit. Past Medical History:   Diagnosis Date    Chronic pain     Joint pain    GERD (gastroesophageal reflux disease)     Hypertension     IBS (irritable bowel syndrome)      Past Surgical History:   Procedure Laterality Date    HX ACL RECONSTRUCTION Right     knee    HX ACL RECONSTRUCTION Bilateral     HX HEENT      cyst on jaw removed    HX ORTHOPAEDIC      HX UROLOGICAL      vasectomy     Family History   Problem Relation Age of Onset    Lupus Mother     Heart Disease Father     Hypertension Father     Cancer Father      Social History     Social History    Marital status:      Spouse name: N/A    Number of children: N/A    Years of education: N/A     Occupational History    Not on file.      Social History Main Topics    Smoking status: Never Smoker    Smokeless tobacco: Never Used    Alcohol use Yes      Comment: occasionally    Drug use: No    Sexual activity: Yes     Partners: Female     Birth control/ protection: None Other Topics Concern    Not on file     Social History Narrative       Review of Systems   Constitutional: Negative. Negative for diaphoresis and malaise/fatigue. Eyes: Negative. Negative for blurred vision. Respiratory: Negative. Negative for shortness of breath. Cardiovascular: Positive for chest pain. Negative for palpitations, claudication and near-syncope. Gastrointestinal: Negative for nausea. Musculoskeletal: Negative. Neurological: Negative. Negative for dizziness. Endo/Heme/Allergies: Negative. Psychiatric/Behavioral: Negative. Visit Vitals    /66 (BP 1 Location: Right arm, BP Patient Position: Sitting)    Pulse 62    Temp 96.6 °F (35.9 °C) (Oral)    Resp 16    Ht 5' 11\" (1.803 m)    Wt 222 lb 3.2 oz (100.8 kg)    SpO2 96%    BMI 30.99 kg/m2       Physical Exam   Constitutional: He is oriented to person, place, and time. He appears well-developed and well-nourished. HENT:   Head: Normocephalic and atraumatic. Cardiovascular: Normal rate, regular rhythm, normal heart sounds and intact distal pulses. Exam reveals no gallop and no friction rub. No murmur heard. Pulmonary/Chest: Effort normal and breath sounds normal. No respiratory distress. He has no wheezes. He has no rales. Musculoskeletal: Normal range of motion. He exhibits no edema or tenderness. Neurological: He is alert and oriented to person, place, and time. No cranial nerve deficit. Coordination normal.   Skin: Skin is warm and dry. No rash noted. No erythema. No pallor. Psychiatric: He has a normal mood and affect. His behavior is normal. Thought content normal.   Nursing note and vitals reviewed. ASSESSMENT and PLAN    ICD-10-CM ICD-9-CM    1. Chest pain, unspecified type R07.9 786.50 AMB POC EKG ROUTINE W/ 12 LEADS, INTER & REP      XR CHEST PA LAT   2. Essential hypertension I10 401.9    3. Gastroesophageal reflux disease without esophagitis K21.9 530.81    4.  Familial hypercholesterolemia E78.01 272.0      Follow-up Disposition:  Return in about 2 weeks (around 7/31/2018).

## 2018-08-07 ENCOUNTER — HOSPITAL ENCOUNTER (OUTPATIENT)
Dept: NON INVASIVE DIAGNOSTICS | Age: 58
Discharge: HOME | End: 2018-08-07
Attending: INTERNAL MEDICINE
Payer: COMMERCIAL

## 2018-08-07 VITALS
DIASTOLIC BLOOD PRESSURE: 92 MMHG | SYSTOLIC BLOOD PRESSURE: 150 MMHG | HEIGHT: 72 IN | BODY MASS INDEX: 29.8 KG/M2 | WEIGHT: 220 LBS

## 2018-08-07 DIAGNOSIS — R07.9 CHEST PAIN, UNSPECIFIED TYPE: ICD-10-CM

## 2018-08-07 LAB
STRESS ANGINA INDEX: 0
STRESS BASELINE HR: 58 BPM
STRESS ESTIMATED WORKLOAD: 14.4 METS
STRESS EXERCISE DUR MIN: NORMAL
STRESS PEAK DIAS BP: 86 MMHG
STRESS PEAK SYS BP: 200 MMHG
STRESS PERCENT HR ACHIEVED: 116 %
STRESS POST PEAK HR: 160 BPM
STRESS RATE PRESSURE PRODUCT: NORMAL BPM*MMHG
STRESS TARGET HR: 138 BPM

## 2018-08-07 PROCEDURE — 93351 STRESS TTE COMPLETE: CPT

## 2018-08-14 ENCOUNTER — OFFICE VISIT (OUTPATIENT)
Dept: FAMILY MEDICINE CLINIC | Age: 58
End: 2018-08-14

## 2018-08-14 VITALS
RESPIRATION RATE: 16 BRPM | BODY MASS INDEX: 30.48 KG/M2 | SYSTOLIC BLOOD PRESSURE: 140 MMHG | DIASTOLIC BLOOD PRESSURE: 70 MMHG | TEMPERATURE: 96 F | HEIGHT: 72 IN | WEIGHT: 225 LBS | OXYGEN SATURATION: 95 % | HEART RATE: 55 BPM

## 2018-08-14 DIAGNOSIS — M79.644 THUMB PAIN, RIGHT: Primary | ICD-10-CM

## 2018-08-14 DIAGNOSIS — I10 ESSENTIAL HYPERTENSION: ICD-10-CM

## 2018-08-14 DIAGNOSIS — E78.01 FAMILIAL HYPERCHOLESTEROLEMIA: ICD-10-CM

## 2018-08-14 DIAGNOSIS — K21.9 GASTROESOPHAGEAL REFLUX DISEASE WITHOUT ESOPHAGITIS: ICD-10-CM

## 2018-08-14 NOTE — PROGRESS NOTES
Guadalupe Jewell is a 61 y.o. male (: 1960) presenting to address:    Chief Complaint   Patient presents with    Hypertension    Cholesterol Problem    GERD    Results         Medication list has been reviewed with Guadalupe Jewell and updated as of today's date     Patient has been asked if refills needed as of today's date in which they replied;  NO    Health Maintenance items with a due date reviewed with patient:  Health Maintenance Due   Topic Date Due    Influenza Age 5 to Adult  2018         Hearing/Vision:   No exam data present    Learning Assessment:     Learning Assessment 2017   PRIMARY LEARNER Patient   PRIMARY LANGUAGE ENGLISH   LEARNER PREFERENCE PRIMARY READING   ANSWERED BY self   RELATIONSHIP SELF       Depression Screening:     PHQ over the last two weeks 2018   Little interest or pleasure in doing things Not at all   Feeling down, depressed, irritable, or hopeless Not at all   Total Score PHQ 2 0       Fall Risk Assessment:     Fall Risk Assessment, last 12 mths 9/15/2017   Able to walk? Yes   Fall in past 12 months? No       Abuse Screening:     Abuse Screening Questionnaire 2018   Do you ever feel afraid of your partner? N   Are you in a relationship with someone who physically or mentally threatens you? N   Is it safe for you to go home? Y       Coordination of Care Questionaire:   1. Have you been to the ER, urgent care clinic since your last visit? Hospitalized since your last visit?  NO

## 2018-08-14 NOTE — PROGRESS NOTES
HISTORY OF PRESENT ILLNESS  Noah Cha is a 61 y.o. male here to go over results of stress test.  Stress test was normal.  Patient states that he is having less problems with reflux. He also states that the chest discomfort that he experiences happens maybe 2 times a year. .  Hypertension    The history is provided by the patient. This is a chronic problem. Associated symptoms include chest pain. Pertinent negatives include no palpitations, no malaise/fatigue, no blurred vision, no dizziness, no shortness of breath and no nausea. There are no associated agents to hypertension. Risk factors include no risk factors. Cholesterol Problem   The history is provided by the patient. This is a chronic problem. Associated symptoms include chest pain. Pertinent negatives include no shortness of breath. The symptoms are relieved by medications. GERD   The history is provided by the patient. This is a recurrent problem. The problem has been gradually improving. Associated symptoms include chest pain. Pertinent negatives include no shortness of breath. Results   Associated symptoms include chest pain. Pertinent negatives include no shortness of breath. Chest Pain (Angina)    The history is provided by the patient. This is a recurrent problem. The problem has been gradually improving. The pain is associated with breathing. The pain is at a severity of 5/10. The pain is moderate. The quality of the pain is described as sharp. The pain does not radiate. Pertinent negatives include no claudication, no diaphoresis, no dizziness, no irregular heartbeat, no malaise/fatigue, no nausea, no near-syncope, no numbness, no palpitations and no shortness of breath. He has tried nothing for the symptoms. Risk factors include dyslipidemia, hypertension and male gender. His past medical history is significant for HTN. Pertinent negatives include no cardiac catheterization, no stress echo and no stress thallium.   Hand Pain    The history is provided by the patient. This is a chronic problem. The problem has not changed since onset. The pain is present in the right wrist and right hand. The pain is at a severity of 5/10. The pain is moderate. Pertinent negatives include no numbness, full range of motion and no tingling. The symptoms are aggravated by activity. He has tried nothing for the symptoms. There has been a history of trauma. Allergies   Allergen Reactions    Doxycycline Anaphylaxis    Keflex [Cephalexin] Anaphylaxis    Pcn [Penicillins] Hives     Current Outpatient Prescriptions on File Prior to Visit   Medication Sig Dispense Refill    losartan (COZAAR) 25 mg tablet Take 1 Tab by mouth daily. 30 Tab 6    pravastatin (PRAVACHOL) 20 mg tablet Take 1 Tab by mouth nightly. 90 Tab 0    esomeprazole (NEXIUM) 40 mg capsule Take 1 Cap by mouth daily. 90 Cap 0    nebivolol (BYSTOLIC) 5 mg tablet Take 1 Tab by mouth daily. 30 Tab 6    tolterodine ER (DETROL LA) 4 mg ER capsule Take 1 Cap by mouth daily. 90 Cap 3    raNITIdine (ZANTAC) 150 mg tablet Take 150 mg by mouth nightly.  aspirin delayed-release 81 mg tablet Take 81 mg by mouth daily.  lysine (L-LYSINE) 500 mg tab tablet Take 500 mg by mouth daily.  Bifidobacterium Infantis (ALIGN) 4 mg cap Take 4 mg by mouth daily.  methylcellulose (CITRUCEL) 500 mg tablet Take 1,000 mg by mouth nightly.  multivitamin (MEN'S MULTI-VITAMIN) tablet Take 1 Tab by mouth daily. No current facility-administered medications on file prior to visit.       Past Medical History:   Diagnosis Date    Chronic pain     Joint pain    GERD (gastroesophageal reflux disease)     Hypertension     IBS (irritable bowel syndrome)      Past Surgical History:   Procedure Laterality Date    HX ACL RECONSTRUCTION Right     knee    HX ACL RECONSTRUCTION Bilateral     HX HEENT      cyst on jaw removed    HX ORTHOPAEDIC      HX UROLOGICAL      vasectomy     Family History Problem Relation Age of Onset    Lupus Mother     Heart Disease Father     Hypertension Father     Cancer Father      Social History     Social History    Marital status:      Spouse name: N/A    Number of children: N/A    Years of education: N/A     Occupational History    Not on file. Social History Main Topics    Smoking status: Never Smoker    Smokeless tobacco: Never Used    Alcohol use Yes      Comment: occasionally    Drug use: No    Sexual activity: Yes     Partners: Female     Birth control/ protection: None     Other Topics Concern    Not on file     Social History Narrative         Review of Systems   Constitutional: Negative. Negative for diaphoresis and malaise/fatigue. Eyes: Negative. Negative for blurred vision. Respiratory: Negative. Negative for shortness of breath. Cardiovascular: Positive for chest pain. Negative for palpitations, claudication and near-syncope. Gastrointestinal: Negative for nausea. Musculoskeletal: Negative. She complains of intermittent right hand pain. Neurological: Negative. Negative for dizziness, tingling and numbness. Endo/Heme/Allergies: Negative. Psychiatric/Behavioral: Negative. Visit Vitals    /70 (BP 1 Location: Right arm, BP Patient Position: Sitting)    Pulse (!) 55    Temp 96 °F (35.6 °C) (Oral)    Resp 16    Ht 6' (1.829 m)    Wt 225 lb (102.1 kg)    SpO2 95%    BMI 30.52 kg/m2       Physical Exam   Constitutional: He is oriented to person, place, and time. He appears well-developed and well-nourished. HENT:   Head: Normocephalic and atraumatic. Cardiovascular: Normal rate, regular rhythm, normal heart sounds and intact distal pulses. Exam reveals no gallop and no friction rub. No murmur heard. Pulmonary/Chest: Effort normal and breath sounds normal. No respiratory distress. He has no wheezes. He has no rales. Musculoskeletal: Normal range of motion.  He exhibits no edema or tenderness. Neurological: He is alert and oriented to person, place, and time. No cranial nerve deficit. Coordination normal.   Skin: Skin is warm and dry. No rash noted. No erythema. No pallor. Psychiatric: He has a normal mood and affect. His behavior is normal. Thought content normal.   Nursing note and vitals reviewed. ASSESSMENT and PLAN    ICD-10-CM ICD-9-CM    1. Thumb pain, right M79.644 729.5 REFERRAL TO HAND SURGERY   2. Familial hypercholesterolemia E78.01 272.0    3. Gastroesophageal reflux disease without esophagitis K21.9 530.81    4. Essential hypertension I10 401.9      Follow-up Disposition:  Return for CPE and F/U.

## 2018-10-29 ENCOUNTER — OFFICE VISIT (OUTPATIENT)
Dept: FAMILY MEDICINE CLINIC | Age: 58
End: 2018-10-29

## 2018-10-29 ENCOUNTER — HOSPITAL ENCOUNTER (OUTPATIENT)
Dept: LAB | Age: 58
Discharge: HOME | End: 2018-10-29
Payer: COMMERCIAL

## 2018-10-29 VITALS
BODY MASS INDEX: 30.88 KG/M2 | HEIGHT: 72 IN | HEART RATE: 63 BPM | SYSTOLIC BLOOD PRESSURE: 122 MMHG | RESPIRATION RATE: 16 BRPM | WEIGHT: 228 LBS | DIASTOLIC BLOOD PRESSURE: 70 MMHG | OXYGEN SATURATION: 96 % | TEMPERATURE: 96.8 F

## 2018-10-29 DIAGNOSIS — I10 ESSENTIAL (PRIMARY) HYPERTENSION: ICD-10-CM

## 2018-10-29 DIAGNOSIS — I10 ESSENTIAL (PRIMARY) HYPERTENSION: Primary | ICD-10-CM

## 2018-10-29 DIAGNOSIS — Z00.00 ROUTINE GENERAL MEDICAL EXAMINATION AT A HEALTH CARE FACILITY: ICD-10-CM

## 2018-10-29 DIAGNOSIS — E78.01 FAMILIAL HYPERCHOLESTEROLEMIA: ICD-10-CM

## 2018-10-29 LAB
ALBUMIN SERPL-MCNC: 4.5 G/DL (ref 3.4–5)
ALBUMIN/GLOB SERPL: 1.6 {RATIO} (ref 0.8–1.7)
ALP SERPL-CCNC: 70 U/L (ref 45–117)
ALT SERPL-CCNC: 59 U/L (ref 16–61)
ANION GAP SERPL CALC-SCNC: 5 MMOL/L (ref 3–18)
APPEARANCE UR: CLEAR
AST SERPL-CCNC: 30 U/L (ref 15–37)
BACTERIA URNS QL MICRO: NEGATIVE /HPF
BILIRUB SERPL-MCNC: 0.6 MG/DL (ref 0.2–1)
BILIRUB UR QL: NEGATIVE
BUN SERPL-MCNC: 18 MG/DL (ref 7–18)
BUN/CREAT SERPL: 17 (ref 12–20)
CALCIUM SERPL-MCNC: 8.6 MG/DL (ref 8.5–10.1)
CHLORIDE SERPL-SCNC: 106 MMOL/L (ref 100–108)
CHOLEST SERPL-MCNC: 163 MG/DL
CO2 SERPL-SCNC: 30 MMOL/L (ref 21–32)
COLOR UR: YELLOW
CREAT SERPL-MCNC: 1.03 MG/DL (ref 0.6–1.3)
EPITH CASTS URNS QL MICRO: NORMAL /LPF (ref 0–5)
GLOBULIN SER CALC-MCNC: 2.9 G/DL (ref 2–4)
GLUCOSE SERPL-MCNC: 107 MG/DL (ref 74–99)
GLUCOSE UR STRIP.AUTO-MCNC: NEGATIVE MG/DL
HDLC SERPL-MCNC: 47 MG/DL (ref 40–60)
HDLC SERPL: 3.5 {RATIO} (ref 0–5)
HGB UR QL STRIP: NEGATIVE
KETONES UR QL STRIP.AUTO: NEGATIVE MG/DL
LDLC SERPL CALC-MCNC: 91.8 MG/DL (ref 0–100)
LEUKOCYTE ESTERASE UR QL STRIP.AUTO: ABNORMAL
LIPID PROFILE,FLP: NORMAL
NITRITE UR QL STRIP.AUTO: NEGATIVE
PH UR STRIP: 5.5 [PH] (ref 5–8)
POTASSIUM SERPL-SCNC: 4.5 MMOL/L (ref 3.5–5.5)
PROT SERPL-MCNC: 7.4 G/DL (ref 6.4–8.2)
PROT UR STRIP-MCNC: NEGATIVE MG/DL
RBC #/AREA URNS HPF: 0 /HPF (ref 0–5)
SODIUM SERPL-SCNC: 141 MMOL/L (ref 136–145)
SP GR UR REFRACTOMETRY: 1.02 (ref 1–1.03)
TRIGL SERPL-MCNC: 121 MG/DL (ref ?–150)
UROBILINOGEN UR QL STRIP.AUTO: 0.2 EU/DL (ref 0.2–1)
VLDLC SERPL CALC-MCNC: 24.2 MG/DL
WBC URNS QL MICRO: NORMAL /HPF (ref 0–4)

## 2018-10-29 PROCEDURE — 81001 URINALYSIS AUTO W/SCOPE: CPT | Performed by: INTERNAL MEDICINE

## 2018-10-29 PROCEDURE — 36415 COLL VENOUS BLD VENIPUNCTURE: CPT | Performed by: INTERNAL MEDICINE

## 2018-10-29 PROCEDURE — 80061 LIPID PANEL: CPT | Performed by: INTERNAL MEDICINE

## 2018-10-29 PROCEDURE — 80053 COMPREHEN METABOLIC PANEL: CPT | Performed by: INTERNAL MEDICINE

## 2018-10-29 RX ORDER — PRAVASTATIN SODIUM 20 MG/1
20 TABLET ORAL
Qty: 90 TAB | Refills: 1 | Status: SHIPPED | OUTPATIENT
Start: 2018-10-29 | End: 2019-04-19 | Stop reason: SDUPTHER

## 2018-10-29 RX ORDER — ESOMEPRAZOLE MAGNESIUM 40 MG/1
40 CAPSULE, DELAYED RELEASE ORAL DAILY
Qty: 90 CAP | Refills: 1 | Status: SHIPPED | OUTPATIENT
Start: 2018-10-29 | End: 2019-04-19 | Stop reason: SDUPTHER

## 2018-10-29 NOTE — PROGRESS NOTES
HISTORY OF PRESENT ILLNESS  Cheyenne Jay is a 61 y.o. male for follow-up on hypertension and hyperlipidemia. .  Hypertension    The history is provided by the patient. This is a chronic problem. The problem has been gradually improving. Pertinent negatives include no chest pain, no blurred vision, no headaches and no shortness of breath. There are no associated agents to hypertension. Risk factors include no risk factors. Cholesterol Problem   The history is provided by the patient. This is a chronic problem. The problem has been gradually improving. Pertinent negatives include no chest pain, no abdominal pain, no headaches and no shortness of breath. The symptoms are aggravated by eating. The symptoms are relieved by medications. Treatments tried: Pravachol. Allergies   Allergen Reactions    Doxycycline Anaphylaxis    Keflex [Cephalexin] Anaphylaxis    Pcn [Penicillins] Hives     Current Outpatient Medications on File Prior to Visit   Medication Sig Dispense Refill    BYSTOLIC 5 mg tablet take 1 tablet by mouth once daily 30 Tab 6    losartan (COZAAR) 25 mg tablet Take 1 Tab by mouth daily. 30 Tab 6    pravastatin (PRAVACHOL) 20 mg tablet Take 1 Tab by mouth nightly. 90 Tab 0    esomeprazole (NEXIUM) 40 mg capsule Take 1 Cap by mouth daily. 90 Cap 0    tolterodine ER (DETROL LA) 4 mg ER capsule Take 1 Cap by mouth daily. 90 Cap 3    raNITIdine (ZANTAC) 150 mg tablet Take 150 mg by mouth nightly.  aspirin delayed-release 81 mg tablet Take 81 mg by mouth daily.  lysine (L-LYSINE) 500 mg tab tablet Take 500 mg by mouth daily.  Bifidobacterium Infantis (ALIGN) 4 mg cap Take 4 mg by mouth daily.  methylcellulose (CITRUCEL) 500 mg tablet Take 1,000 mg by mouth nightly.  multivitamin (MEN'S MULTI-VITAMIN) tablet Take 1 Tab by mouth daily. No current facility-administered medications on file prior to visit.       Past Medical History:   Diagnosis Date    Chronic pain Joint pain    GERD (gastroesophageal reflux disease)     Hypertension     IBS (irritable bowel syndrome)      Past Surgical History:   Procedure Laterality Date    HX ACL RECONSTRUCTION Right     knee    HX ACL RECONSTRUCTION Bilateral     HX HEENT      cyst on jaw removed    HX ORTHOPAEDIC      HX UROLOGICAL      vasectomy     Family History   Problem Relation Age of Onset    Lupus Mother     Heart Disease Father     Hypertension Father     Cancer Father     Cancer Brother         bone marrow    Cancer Maternal Aunt         bone marrow    Cancer Maternal Uncle         bone marrow     Social History     Socioeconomic History    Marital status:      Spouse name: Not on file    Number of children: Not on file    Years of education: Not on file    Highest education level: Not on file   Social Needs    Financial resource strain: Not on file    Food insecurity - worry: Not on file    Food insecurity - inability: Not on file   [employer] needs - medical: Not on file   [employer] needs - non-medical: Not on file   Occupational History    Not on file   Tobacco Use    Smoking status: Never Smoker    Smokeless tobacco: Never Used   Substance and Sexual Activity    Alcohol use: Yes     Comment: occasionally    Drug use: No    Sexual activity: Yes     Partners: Female     Birth control/protection: None   Other Topics Concern    Not on file   Social History Narrative    Not on file       Review of Systems   Constitutional: Negative. Eyes: Negative. Negative for blurred vision. Respiratory: Negative. Negative for shortness of breath. Cardiovascular: Negative. Negative for chest pain. Gastrointestinal: Negative for abdominal pain. Musculoskeletal: Negative. Neurological: Negative. Negative for headaches. Endo/Heme/Allergies: Negative. Psychiatric/Behavioral: Negative.       Visit Vitals  /70 (BP 1 Location: Right arm, BP Patient Position: Sitting) Pulse 63   Temp 96.8 °F (36 °C) (Oral)   Resp 16   Ht 6' (1.829 m)   Wt 228 lb (103.4 kg)   SpO2 96%   BMI 30.92 kg/m²       Physical Exam   Constitutional: He is oriented to person, place, and time. He appears well-developed and well-nourished. HENT:   Head: Normocephalic and atraumatic. Cardiovascular: Normal rate, regular rhythm, normal heart sounds and intact distal pulses. Exam reveals no gallop and no friction rub. No murmur heard. Pulmonary/Chest: Effort normal and breath sounds normal. No respiratory distress. He has no wheezes. He has no rales. Musculoskeletal: Normal range of motion. He exhibits no edema or tenderness. Neurological: He is alert and oriented to person, place, and time. No cranial nerve deficit. Coordination normal.   Skin: Skin is warm and dry. No rash noted. No erythema. No pallor. Psychiatric: He has a normal mood and affect. His behavior is normal. Thought content normal.   Nursing note and vitals reviewed. ASSESSMENT and PLAN    ICD-10-CM ICD-9-CM    1. Essential (primary) hypertension I10 401.9 AMB POC EKG ROUTINE W/ 12 LEADS, INTER & REP      METABOLIC PANEL, COMPREHENSIVE      URINALYSIS W/ RFLX MICROSCOPIC   2. Familial hypercholesterolemia K70.46 252.8 METABOLIC PANEL, COMPREHENSIVE      LIPID PANEL     Follow-up Disposition:  Return in about 6 months (around 4/29/2019).

## 2018-10-29 NOTE — PROGRESS NOTES
HISTORY OF PRESENT ILLNESS  Lashay Aguiar is a 61 y.o. male Presents today for a complete physical and preventative medicine exam.  Past medical history is significant for: Hypertension and hyperlipidemia  Last colonoscopy 2-3 years ago  Last eye examination 2 years ago  Last dental exam last month  Last PSA   . Complete Physical   The history is provided by the patient and medical records. Pertinent negatives include no chest pain, no abdominal pain, no headaches and no shortness of breath. Allergies   Allergen Reactions    Doxycycline Anaphylaxis    Keflex [Cephalexin] Anaphylaxis    Pcn [Penicillins] Hives     Current Outpatient Medications on File Prior to Visit   Medication Sig Dispense Refill    BYSTOLIC 5 mg tablet take 1 tablet by mouth once daily 30 Tab 6    losartan (COZAAR) 25 mg tablet Take 1 Tab by mouth daily. 30 Tab 6    tolterodine ER (DETROL LA) 4 mg ER capsule Take 1 Cap by mouth daily. 90 Cap 3    raNITIdine (ZANTAC) 150 mg tablet Take 150 mg by mouth nightly.  aspirin delayed-release 81 mg tablet Take 81 mg by mouth daily.  lysine (L-LYSINE) 500 mg tab tablet Take 500 mg by mouth daily.  Bifidobacterium Infantis (ALIGN) 4 mg cap Take 4 mg by mouth daily.  methylcellulose (CITRUCEL) 500 mg tablet Take 1,000 mg by mouth nightly.  multivitamin (MEN'S MULTI-VITAMIN) tablet Take 1 Tab by mouth daily. No current facility-administered medications on file prior to visit.       Past Medical History:   Diagnosis Date    Chronic pain     Joint pain    GERD (gastroesophageal reflux disease)     Hypertension     IBS (irritable bowel syndrome)      Past Surgical History:   Procedure Laterality Date    HX ACL RECONSTRUCTION Right     knee    HX ACL RECONSTRUCTION Bilateral     HX HEENT      cyst on jaw removed    HX ORTHOPAEDIC      HX UROLOGICAL      vasectomy     Family History   Problem Relation Age of Onset    Lupus Mother     Heart Disease Father  Hypertension Father     Cancer Father     Cancer Brother         bone marrow    Cancer Maternal Aunt         bone marrow    Cancer Maternal Uncle         bone marrow     Social History     Socioeconomic History    Marital status:      Spouse name: Not on file    Number of children: Not on file    Years of education: Not on file    Highest education level: Not on file   Social Needs    Financial resource strain: Not on file    Food insecurity - worry: Not on file    Food insecurity - inability: Not on file   [employer] needs - medical: Not on file   [employer] needs - non-medical: Not on file   Occupational History    Not on file   Tobacco Use    Smoking status: Never Smoker    Smokeless tobacco: Never Used   Substance and Sexual Activity    Alcohol use: Yes     Comment: occasionally    Drug use: No    Sexual activity: Yes     Partners: Female     Birth control/protection: None   Other Topics Concern    Not on file   Social History Narrative    Not on file         Review of Systems   Constitutional: Negative. Negative for chills, diaphoresis, fever, malaise/fatigue and weight loss. HENT: Negative. Negative for congestion, ear discharge, ear pain, hearing loss, nosebleeds, sinus pain, sore throat and tinnitus. Eyes: Negative. Negative for blurred vision, double vision, photophobia, pain, discharge and redness. Respiratory: Negative. Negative for cough, hemoptysis, sputum production, shortness of breath and wheezing. Cardiovascular: Negative. Negative for chest pain, palpitations, orthopnea, claudication, leg swelling and PND. Gastrointestinal: Positive for heartburn. Negative for abdominal pain, blood in stool, constipation, diarrhea, melena, nausea and vomiting. Genitourinary: Negative. Negative for dysuria, flank pain, frequency, hematuria and urgency. Musculoskeletal: Negative. Negative for back pain, joint pain, myalgias and neck pain.    Skin: Negative. Negative for itching and rash. Neurological: Negative. Negative for dizziness, tingling, tremors, sensory change, speech change, focal weakness, seizures, loss of consciousness, weakness and headaches. Endo/Heme/Allergies: Positive for environmental allergies. Negative for polydipsia. Does not bruise/bleed easily. Psychiatric/Behavioral: Negative. Negative for depression, hallucinations, memory loss, substance abuse and suicidal ideas. The patient is not nervous/anxious and does not have insomnia. Visit Vitals  /70 (BP 1 Location: Right arm, BP Patient Position: Sitting)   Pulse 63   Temp 96.8 °F (36 °C) (Oral)   Resp 16   Ht 6' (1.829 m)   Wt 228 lb (103.4 kg)   SpO2 96%   BMI 30.92 kg/m²         Physical Exam   Constitutional: He is oriented to person, place, and time. He appears well-developed and well-nourished. HENT:   Head: Normocephalic and atraumatic. Right Ear: External ear normal.   Left Ear: External ear normal.   Nose: Nose normal.   Mouth/Throat: Oropharynx is clear and moist. No oropharyngeal exudate. Eyes: Conjunctivae and EOM are normal. Pupils are equal, round, and reactive to light. Right eye exhibits no discharge. Left eye exhibits no discharge. No scleral icterus. Neck: Normal range of motion. Neck supple. No JVD present. No tracheal deviation present. No thyromegaly present. Cardiovascular: Normal rate, regular rhythm, normal heart sounds and intact distal pulses. Exam reveals no gallop and no friction rub. No murmur heard. Pulmonary/Chest: Effort normal and breath sounds normal. No respiratory distress. He has no wheezes. He has no rales. He exhibits no tenderness. Abdominal: Soft. Bowel sounds are normal. He exhibits no distension and no mass. There is no tenderness. There is no rebound and no guarding. Musculoskeletal: Normal range of motion. He exhibits no edema, tenderness or deformity. Lymphadenopathy:     He has no cervical adenopathy. Neurological: He is alert and oriented to person, place, and time. No cranial nerve deficit. Coordination normal.   Skin: Skin is warm and dry. No rash noted. No erythema. No pallor. Psychiatric: He has a normal mood and affect. His behavior is normal. Judgment and thought content normal.   Nursing note and vitals reviewed. ASSESSMENT and PLAN    ICD-10-CM ICD-9-CM    3. Routine general medical examination at a health care facility Z00.00 V70.0 CBC WITH AUTOMATED DIFF      PSA SCREENING (SCREENING)     Follow-up Disposition:  Return in about 6 months (around 4/29/2019).

## 2018-10-30 ENCOUNTER — TELEPHONE (OUTPATIENT)
Dept: FAMILY MEDICINE CLINIC | Age: 58
End: 2018-10-30

## 2018-10-30 DIAGNOSIS — R39.15 URINARY URGENCY: ICD-10-CM

## 2018-10-30 DIAGNOSIS — N32.81 OAB (OVERACTIVE BLADDER): ICD-10-CM

## 2018-10-30 NOTE — TELEPHONE ENCOUNTER
Spoke to pts spouse and made aware of the message, verbalized understanding.  Thank you  Soto Kramer, JOVANA

## 2018-10-30 NOTE — TELEPHONE ENCOUNTER
----- Message from Colt Awad MD sent at 10/30/2018  9:16 AM EDT -----  Labs are essentially within normal limits

## 2018-11-02 RX ORDER — TOLTERODINE 4 MG/1
4 CAPSULE, EXTENDED RELEASE ORAL DAILY
Qty: 90 CAP | Refills: 3 | Status: SHIPPED | OUTPATIENT
Start: 2018-11-02 | End: 2019-03-26 | Stop reason: SDUPTHER

## 2018-11-07 ENCOUNTER — TELEPHONE (OUTPATIENT)
Dept: FAMILY MEDICINE CLINIC | Age: 58
End: 2018-11-07

## 2018-11-07 NOTE — TELEPHONE ENCOUNTER
Regarding: Test Results Question  Contact: [phone]  ----- Message from [address] [place] [address] [address], Generic sent at 11/5/2018  9:34 PM EST -----    Hi Dr. Sonia Molina,    Just reviewed my lab results would like to know if you think the following are of concern or if I should take any action. Metabolic Panel, Comprehensive:   - Glucose is 107 (Standard range is 74-99mg/dl)  - BUN is 18 (Standard range is 7-18 MB/DL)    Urinalysis with RFLX microscopic:  - Leukocyte Esterase is SMALL (Standard range is NEG)    Urine Microscopic:  - Ep[ithelial Cells is FEW (Standard range is 0-5)  - WBC is 3 to 6 (Standard range is 0-4/hpf)    As a reminder, my brother and mother's brother and sister all had or have Leukemia.     Thanks    Louis Lima

## 2019-03-04 ENCOUNTER — OFFICE VISIT (OUTPATIENT)
Dept: FAMILY MEDICINE CLINIC | Age: 59
End: 2019-03-04

## 2019-03-04 VITALS
BODY MASS INDEX: 31.42 KG/M2 | HEART RATE: 64 BPM | WEIGHT: 232 LBS | HEIGHT: 72 IN | RESPIRATION RATE: 16 BRPM | TEMPERATURE: 97.6 F | SYSTOLIC BLOOD PRESSURE: 142 MMHG | DIASTOLIC BLOOD PRESSURE: 80 MMHG | OXYGEN SATURATION: 96 %

## 2019-03-04 DIAGNOSIS — I10 ESSENTIAL HYPERTENSION: Primary | ICD-10-CM

## 2019-03-04 DIAGNOSIS — Z12.5 SCREENING FOR PROSTATE CANCER: ICD-10-CM

## 2019-03-04 RX ORDER — LOSARTAN POTASSIUM 100 MG/1
100 TABLET ORAL DAILY
Qty: 30 TAB | Refills: 6 | Status: SHIPPED | OUTPATIENT
Start: 2019-03-04 | End: 2019-09-19 | Stop reason: SDUPTHER

## 2019-03-04 NOTE — PROGRESS NOTES
Sarahi Hewitt is a 60 y.o. male (: 1960) presenting to address: Chief Complaint Patient presents with  Hypertension BP issues Vitals:  
 19 1704 BP: 142/80 Pulse: 64 Resp: 16 Temp: 97.6 °F (36.4 °C) TempSrc: Temporal  
SpO2: 96% Weight: 232 lb (105.2 kg) Height: 6' (1.829 m) PainSc:   4 PainLoc: Head Hearing/Vision: No exam data present Learning Assessment:  
 
Learning Assessment 3/4/2019 PRIMARY LEARNER Patient PRIMARY LANGUAGE ENGLISH  
LEARNER PREFERENCE PRIMARY PICTURES  
  LISTENING  
  VIDEOS  
  DEMONSTRATION  
ANSWERED BY patient RELATIONSHIP SELF Depression Screening:  
 
3 most recent PHQ Screens 3/4/2019 Little interest or pleasure in doing things Not at all Feeling down, depressed, irritable, or hopeless Not at all Total Score PHQ 2 0 Fall Risk Assessment:  
 
Fall Risk Assessment, last 12 mths 3/4/2019 Able to walk? Yes Fall in past 12 months? No  
 
Abuse Screening:  
 
Abuse Screening Questionnaire 3/4/2019 Do you ever feel afraid of your partner? Gerardo Galvez Are you in a relationship with someone who physically or mentally threatens you? Gerardo Galvez Is it safe for you to go home? Cathryn Aggarwal Coordination of Care Questionaire: 1. Have you been to the ER, urgent care clinic since your last visit? Hospitalized since your last visit? NO 
 
2. Have you seen or consulted any other health care providers outside of the [address] [place] [address] since your last visit? Include any pap smears or colon screening.  NO

## 2019-03-04 NOTE — PROGRESS NOTES
HISTORY OF PRESENT ILLNESS 
Anatoliy Hernandez is a 60 y.o. male here for follow-up on hypertension. States that his blood pressure has been up for the past month. Highest being 161/108. Seferino Young Hypertension The history is provided by the patient and medical records. This is a chronic problem. The problem has been gradually worsening. Pertinent negatives include no chest pain, no orthopnea, no peripheral edema, no dizziness and no shortness of breath. There are no associated agents to hypertension. Risk factors include male gender. Other The history is provided by the patient and medical records (Patient is due for prostate screening). The problem has not changed since onset. Pertinent negatives include no chest pain and no shortness of breath. Allergies Allergen Reactions  Doxycycline Anaphylaxis  Keflex [Cephalexin] Anaphylaxis  Pcn [Penicillins] Hives Current Outpatient Medications on File Prior to Visit Medication Sig Dispense Refill  tolterodine ER (DETROL LA) 4 mg ER capsule Take 1 Cap by mouth daily. (Patient taking differently: Take 8 mg by mouth daily.) 90 Cap 3  pravastatin (PRAVACHOL) 20 mg tablet Take 1 Tab by mouth nightly. 90 Tab 1  
 esomeprazole (NEXIUM) 40 mg capsule Take 1 Cap by mouth daily. (Patient taking differently: Take 40 mg by mouth every morning.) 90 Cap 1  
 BYSTOLIC 5 mg tablet take 1 tablet by mouth once daily 30 Tab 6  
 losartan (COZAAR) 25 mg tablet Take 1 Tab by mouth daily. 30 Tab 6  
 raNITIdine (ZANTAC) 150 mg tablet Take 150 mg by mouth nightly.  aspirin delayed-release 81 mg tablet Take 81 mg by mouth daily.  lysine (L-LYSINE) 500 mg tab tablet Take 500 mg by mouth daily.  Bifidobacterium Infantis (ALIGN) 4 mg cap Take 4 mg by mouth daily.  methylcellulose (CITRUCEL) 500 mg tablet Take 1,000 mg by mouth nightly.  multivitamin (MEN'S MULTI-VITAMIN) tablet Take 1 Tab by mouth daily. No current facility-administered medications on file prior to visit. Past Medical History:  
Diagnosis Date  Chronic pain Joint pain  GERD (gastroesophageal reflux disease)  Hypertension  IBS (irritable bowel syndrome)  Nocturia  OAB (overactive bladder)  Urinary frequency  Urinary urgency Past Surgical History:  
Procedure Laterality Date  HX ACL RECONSTRUCTION Right   
 knee  HX ACL RECONSTRUCTION Bilateral   
 HX HEENT    
 cyst on jaw removed  HX ORTHOPAEDIC    
 HX OTHER SURGICAL Left   
 mass removed from left shoulder  HX OTHER SURGICAL    
 3 dental implants  HX UROLOGICAL    
 vasectomy Family History Problem Relation Age of Onset  Lupus Mother  Heart Disease Father  Hypertension Father  Cancer Father   
     skin cancer  Cancer Brother   
     bone marrow; cll  Cancer Maternal Aunt   
     bone marrow; cll  Cancer Maternal Uncle   
     bone marrow; cll Social History Socioeconomic History  Marital status:  Spouse name: Not on file  Number of children: Not on file  Years of education: Not on file  Highest education level: Not on file Social Needs  Financial resource strain: Not on file  Food insecurity - worry: Not on file  Food insecurity - inability: Not on file  Transportation needs - medical: Not on file  Transportation needs - non-medical: Not on file Occupational History  Not on file Tobacco Use  Smoking status: Never Smoker  Smokeless tobacco: Never Used Substance and Sexual Activity  Alcohol use: Yes Comment: occasionally  Drug use: No  
 Sexual activity: Yes  
  Partners: Female Birth control/protection: None Other Topics Concern  Not on file Social History Narrative  Not on file Review of Systems Constitutional: Negative. Eyes: Negative. Respiratory: Negative. Negative for shortness of breath. Cardiovascular: Negative. Negative for chest pain and orthopnea. Musculoskeletal: Negative. Neurological: Negative. Negative for dizziness. Endo/Heme/Allergies: Negative. Psychiatric/Behavioral: Negative. Visit Vitals /80 (BP 1 Location: Right arm, BP Patient Position: Sitting) Pulse 64 Temp 97.6 °F (36.4 °C) (Temporal) Resp 16 Ht 6' (1.829 m) Wt 232 lb (105.2 kg) SpO2 96% BMI 31.46 kg/m² Physical Exam  
Constitutional: He is oriented to person, place, and time. He appears well-developed and well-nourished. HENT:  
Head: Normocephalic and atraumatic. Cardiovascular: Normal rate, regular rhythm, normal heart sounds and intact distal pulses. Exam reveals no gallop and no friction rub. No murmur heard. Pulmonary/Chest: Effort normal and breath sounds normal. No respiratory distress. He has no wheezes. He has no rales. Musculoskeletal: Normal range of motion. He exhibits no edema or tenderness. Neurological: He is alert and oriented to person, place, and time. No cranial nerve deficit. Coordination normal.  
Skin: Skin is warm and dry. No rash noted. No erythema. No pallor. Psychiatric: He has a normal mood and affect. His behavior is normal. Thought content normal.  
Nursing note and vitals reviewed. ASSESSMENT and PLAN 
  ICD-10-CM ICD-9-CM 1. Essential hypertension I10 401.9 2. Screening for prostate cancer Z12.5 V76.44 PSA SCREENING (SCREENING) Follow-up Disposition: 
Return in about 4 weeks (around 4/1/2019). She has been given a prescription for losartan 100 mg to take once daily. However because he has been away vacationing his sodium content in the food that he was eating is probably somewhat higher. Been advised to check his blood pressure daily for the next 2 weeks eating as he normally does. If pressure remains elevated he is to start losartan 100 mg daily. Pressure comes down he is to hold losartan and follow-up with me in 6 weeks

## 2019-03-18 NOTE — TELEPHONE ENCOUNTER
Per provider note from last visit, \"She has been given a prescription for losartan 100 mg to take once daily. However because he has been away vacationing his sodium content in the food that he was eating is probably somewhat higher. Been advised to check his blood pressure daily for the next 2 weeks eating as he normally does. If pressure remains elevated he is to start losartan 100 mg daily. Pressure comes down he is to hold losartan and follow-up with me in 6 weeks\"   Pt's wife stated pt is requesting to take 2 25mg before jumping to 100mg. Is this possible? Please call wife back.

## 2019-03-21 NOTE — TELEPHONE ENCOUNTER
Spoke with patient's wife. Patient's BP is roughly 150s over 90-100s. He has still only been taking the 25 mg. He wants to take 2 of the 25 mg instead of going right up to 100mg right away. They will continue to monitor the blood pressures and go up if the pressures dont come down.

## 2019-03-22 ENCOUNTER — HOSPITAL ENCOUNTER (OUTPATIENT)
Dept: LAB | Age: 59
Discharge: HOME | End: 2019-03-22
Payer: COMMERCIAL

## 2019-03-22 DIAGNOSIS — Z12.5 SCREENING FOR PROSTATE CANCER: ICD-10-CM

## 2019-03-22 LAB — PSA SERPL-MCNC: 1.3 NG/ML (ref 0–4)

## 2019-03-22 PROCEDURE — 84153 ASSAY OF PSA TOTAL: CPT

## 2019-03-22 PROCEDURE — 36415 COLL VENOUS BLD VENIPUNCTURE: CPT

## 2019-03-25 ENCOUNTER — TELEPHONE (OUTPATIENT)
Dept: FAMILY MEDICINE CLINIC | Age: 59
End: 2019-03-25

## 2019-03-25 NOTE — TELEPHONE ENCOUNTER
Pt's wife requesting referral to cardio. Aware may have to wait until appt but is not scheduled until 4/16/19.

## 2019-03-26 NOTE — TELEPHONE ENCOUNTER
Spoke to patient's wife regarding their concern. She states that the patient's diastolic bp has remained elevated even with the higher dose of blood pressure medication. It is ranging in the the 90s. Patient is concerned due to his family history of early MI and bypass. Denies having chest pain, but has some shortness of breath with going up and down stairs. They would like to see a physician with the [facility].

## 2019-03-26 NOTE — TELEPHONE ENCOUNTER
Safest thing to do is to continue to take 25 mg and chart his blood pressures I should be seeing him in a week or so

## 2019-03-31 NOTE — TELEPHONE ENCOUNTER
Please inform Mr. Charlie Osborn and his wife that I am a certified hypertension specialist.  I can adjust his medications accordingly. He just needs a follow-up appointment. He also needs to record his blood pressure readings at least twice a day.

## 2019-04-01 NOTE — TELEPHONE ENCOUNTER
Called and spoke to patient's wife. She states that his blood pressure has been coming down to 130/80. They will wait until his f/u appt on the 16th. Closing encounter.

## 2019-04-16 ENCOUNTER — OFFICE VISIT (OUTPATIENT)
Dept: FAMILY MEDICINE CLINIC | Age: 59
End: 2019-04-16

## 2019-04-16 ENCOUNTER — HOSPITAL ENCOUNTER (OUTPATIENT)
Dept: LAB | Age: 59
Discharge: HOME | End: 2019-04-16
Payer: COMMERCIAL

## 2019-04-16 VITALS
TEMPERATURE: 96 F | SYSTOLIC BLOOD PRESSURE: 112 MMHG | DIASTOLIC BLOOD PRESSURE: 72 MMHG | BODY MASS INDEX: 31.64 KG/M2 | HEIGHT: 72 IN | OXYGEN SATURATION: 95 % | HEART RATE: 64 BPM | WEIGHT: 233.6 LBS | RESPIRATION RATE: 16 BRPM

## 2019-04-16 DIAGNOSIS — E78.01 FAMILIAL HYPERCHOLESTEROLEMIA: ICD-10-CM

## 2019-04-16 DIAGNOSIS — I10 ESSENTIAL HYPERTENSION: Primary | ICD-10-CM

## 2019-04-16 DIAGNOSIS — I10 ESSENTIAL HYPERTENSION: ICD-10-CM

## 2019-04-16 DIAGNOSIS — Z12.11 SCREENING FOR COLON CANCER: ICD-10-CM

## 2019-04-16 LAB
ALBUMIN SERPL-MCNC: 4.1 G/DL (ref 3.4–5)
ALBUMIN/GLOB SERPL: 1.6 {RATIO} (ref 0.8–1.7)
ALP SERPL-CCNC: 65 U/L (ref 45–117)
ALT SERPL-CCNC: 62 U/L (ref 16–61)
ANION GAP SERPL CALC-SCNC: 7 MMOL/L (ref 3–18)
AST SERPL-CCNC: 32 U/L (ref 15–37)
BILIRUB SERPL-MCNC: 0.3 MG/DL (ref 0.2–1)
BUN SERPL-MCNC: 21 MG/DL (ref 7–18)
BUN/CREAT SERPL: 20 (ref 12–20)
CALCIUM SERPL-MCNC: 8.4 MG/DL (ref 8.5–10.1)
CHLORIDE SERPL-SCNC: 106 MMOL/L (ref 100–108)
CHOLEST SERPL-MCNC: 137 MG/DL
CO2 SERPL-SCNC: 25 MMOL/L (ref 21–32)
CREAT SERPL-MCNC: 1.05 MG/DL (ref 0.6–1.3)
GLOBULIN SER CALC-MCNC: 2.6 G/DL (ref 2–4)
GLUCOSE SERPL-MCNC: 107 MG/DL (ref 74–99)
HDLC SERPL-MCNC: 41 MG/DL (ref 40–60)
HDLC SERPL: 3.3 {RATIO} (ref 0–5)
LDLC SERPL CALC-MCNC: 49.6 MG/DL (ref 0–100)
LIPID PROFILE,FLP: ABNORMAL
POTASSIUM SERPL-SCNC: 4.3 MMOL/L (ref 3.5–5.5)
PROT SERPL-MCNC: 6.7 G/DL (ref 6.4–8.2)
SODIUM SERPL-SCNC: 138 MMOL/L (ref 136–145)
TRIGL SERPL-MCNC: 232 MG/DL (ref ?–150)
VLDLC SERPL CALC-MCNC: 46.4 MG/DL

## 2019-04-16 PROCEDURE — 80053 COMPREHEN METABOLIC PANEL: CPT

## 2019-04-16 PROCEDURE — 80061 LIPID PANEL: CPT

## 2019-04-16 PROCEDURE — 36415 COLL VENOUS BLD VENIPUNCTURE: CPT

## 2019-04-16 NOTE — PROGRESS NOTES
HISTORY OF PRESENT ILLNESS 
Guadalupe Jewell is a 60 y.o. male here for follow-up on hypertension and hyperlipidemia. Patient's blood pressure at home have been elevated. Averaging 197M systolic. Oris Riley Hypertension The history is provided by the patient. This is a chronic problem. The problem has been gradually improving. Associated symptoms include anxiety. Pertinent negatives include no chest pain, no orthopnea, no blurred vision, no headaches, no peripheral edema, no dizziness and no shortness of breath. There are no associated agents to hypertension. Risk factors include no risk factors. Cholesterol Problem The history is provided by the patient. This is a chronic problem. The problem has been gradually improving. Pertinent negatives include no chest pain, no headaches and no shortness of breath. The symptoms are aggravated by eating. The symptoms are relieved by medications. Treatments tried: Pravachol. Colon Cancer Screening The history is provided by the medical records and patient. Pertinent negatives include no chest pain, no headaches and no shortness of breath. Allergies Allergen Reactions  Doxycycline Anaphylaxis  Keflex [Cephalexin] Anaphylaxis  Pcn [Penicillins] Hives Current Outpatient Medications on File Prior to Visit Medication Sig Dispense Refill  tolterodine ER (DETROL LA) 4 mg ER capsule Take 1 Cap by mouth daily. 90 Cap 3  
 losartan (COZAAR) 100 mg tablet Take 1 Tab by mouth daily. 30 Tab 6  pravastatin (PRAVACHOL) 20 mg tablet Take 1 Tab by mouth nightly. 90 Tab 1  
 esomeprazole (NEXIUM) 40 mg capsule Take 1 Cap by mouth daily. (Patient taking differently: Take 40 mg by mouth every morning.) 90 Cap 1  
 BYSTOLIC 5 mg tablet take 1 tablet by mouth once daily 30 Tab 6  
 raNITIdine (ZANTAC) 150 mg tablet Take 150 mg by mouth nightly.  aspirin delayed-release 81 mg tablet Take 81 mg by mouth daily.  lysine (L-LYSINE) 500 mg tab tablet Take 500 mg by mouth daily.  Bifidobacterium Infantis (ALIGN) 4 mg cap Take 4 mg by mouth daily.  methylcellulose (CITRUCEL) 500 mg tablet Take 1,000 mg by mouth nightly.  multivitamin (MEN'S MULTI-VITAMIN) tablet Take 1 Tab by mouth daily. No current facility-administered medications on file prior to visit. Past Medical History:  
Diagnosis Date  Chronic pain Joint pain  GERD (gastroesophageal reflux disease)  Hypertension  IBS (irritable bowel syndrome)  Nocturia  OAB (overactive bladder)  Urinary frequency  Urinary urgency Past Surgical History:  
Procedure Laterality Date  HX ACL RECONSTRUCTION Right   
 knee  HX ACL RECONSTRUCTION Bilateral   
 HX HEENT    
 cyst on jaw removed  HX ORTHOPAEDIC    
 HX OTHER SURGICAL Left   
 mass removed from left shoulder  HX OTHER SURGICAL    
 3 dental implants  HX UROLOGICAL    
 vasectomy Family History Problem Relation Age of Onset  Lupus Mother  Heart Disease Father  Hypertension Father  Cancer Father   
     skin cancer  Cancer Brother   
     bone marrow; cll  Cancer Maternal Aunt   
     bone marrow; cll  Cancer Maternal Uncle   
     bone marrow; cll Social History Socioeconomic History  Marital status:  Spouse name: Not on file  Number of children: Not on file  Years of education: Not on file  Highest education level: Not on file Occupational History  Not on file Social Needs  Financial resource strain: Not on file  Food insecurity:  
  Worry: Not on file Inability: Not on file  Transportation needs:  
  Medical: Not on file Non-medical: Not on file Tobacco Use  Smoking status: Never Smoker  Smokeless tobacco: Never Used Substance and Sexual Activity  Alcohol use: Yes Comment: occasionally  Drug use: No  
 Sexual activity: Yes  
 Partners: Female Birth control/protection: None Lifestyle  Physical activity:  
  Days per week: Not on file Minutes per session: Not on file  Stress: Not on file Relationships  Social connections:  
  Talks on phone: Not on file Gets together: Not on file Attends [employer] service: Not on file Active member of club or organization: Not on file Attends meetings of clubs or organizations: Not on file Relationship status: Not on file  Intimate partner violence:  
  Fear of current or ex partner: Not on file Emotionally abused: Not on file Physically abused: Not on file Forced sexual activity: Not on file Other Topics Concern  Not on file Social History Narrative  Not on file Review of Systems Constitutional: Negative. Eyes: Negative. Negative for blurred vision. Respiratory: Negative. Negative for shortness of breath. Cardiovascular: Negative. Negative for chest pain and orthopnea. Musculoskeletal: Negative. Neurological: Negative. Negative for dizziness and headaches. Endo/Heme/Allergies: Negative. Psychiatric/Behavioral: Negative. Visit Vitals /72 (BP 1 Location: Right arm, BP Patient Position: Sitting) Pulse 64 Temp 96 °F (35.6 °C) (Temporal) Resp 16 Ht 6' (1.829 m) Wt 233 lb 9.6 oz (106 kg) SpO2 95% BMI 31.68 kg/m² Physical Exam  
Constitutional: He is oriented to person, place, and time. He appears well-developed and well-nourished. HENT:  
Head: Normocephalic and atraumatic. Cardiovascular: Normal rate, regular rhythm, normal heart sounds and intact distal pulses. Exam reveals no gallop and no friction rub. No murmur heard. Pulmonary/Chest: Effort normal and breath sounds normal. No respiratory distress. He has no wheezes. He has no rales. Musculoskeletal: Normal range of motion. He exhibits no edema or tenderness. Neurological: He is alert and oriented to person, place, and time. No cranial nerve deficit. Coordination normal.  
Skin: Skin is warm and dry. No rash noted. No erythema. No pallor. Psychiatric: He has a normal mood and affect. His behavior is normal. Thought content normal.  
Nursing note and vitals reviewed. ASSESSMENT and PLAN 
  ICD-10-CM ICD-9-CM 1. Essential hypertension Z96 268.3 METABOLIC PANEL, COMPREHENSIVE 2. Familial hypercholesterolemia E78.01 272.0 LIPID PANEL 3. Screening for colon cancer Z12.11 V76.51 Patient has been instructed to check blood pressures daily and chart Follow-up and Dispositions · Return for keep regular scheduled appointment.

## 2019-04-16 NOTE — PROGRESS NOTES
Wilfred Osborne is a 60 y.o. male (: 1960) presenting to address: Chief Complaint Patient presents with  Referral Request  
  CARDIOLOGY REFERRAL Vitals:  
 19 1500 BP: 112/72 Pulse: 64 Resp: 16 Temp: 96 °F (35.6 °C) TempSrc: Temporal  
SpO2: 95% Weight: 233 lb 9.6 oz (106 kg) Height: 6' (1.829 m) PainSc:   0 - No pain Hearing/Vision: No exam data present Learning Assessment:  
 
Learning Assessment 2019 PRIMARY LEARNER Patient PRIMARY LANGUAGE ENGLISH  
LEARNER PREFERENCE PRIMARY PICTURES  
  LISTENING  
  VIDEOS  
  DEMONSTRATION  
ANSWERED BY patient RELATIONSHIP SELF Depression Screening:  
 
3 most recent PHQ Screens 3/4/2019 Little interest or pleasure in doing things Not at all Feeling down, depressed, irritable, or hopeless Not at all Total Score PHQ 2 0 Fall Risk Assessment:  
 
Fall Risk Assessment, last 12 mths 2019 Able to walk? Yes Fall in past 12 months? No  
 
Abuse Screening:  
 
Abuse Screening Questionnaire 2019 Do you ever feel afraid of your partner? Nani Hernandez Are you in a relationship with someone who physically or mentally threatens you? Nani Hernandez Is it safe for you to go home? Elisabet Morejon Coordination of Care Questionaire: 1. Have you been to the ER, urgent care clinic since your last visit? Hospitalized since your last visit? NO 
 
2. Have you seen or consulted any other health care providers outside of the [address] [place] [address] since your last visit? Include any pap smears or colon screening.  NO

## 2019-04-17 RX ORDER — NEBIVOLOL 5 MG/1
TABLET ORAL
Qty: 30 TAB | Refills: 6 | Status: SHIPPED | OUTPATIENT
Start: 2019-04-17 | End: 2019-09-30 | Stop reason: SDUPTHER

## 2019-04-17 NOTE — TELEPHONE ENCOUNTER
Pt seed yesterday.     Requested Prescriptions     Pending Prescriptions Disp Refills    nebivolol (BYSTOLIC) 5 mg tablet 30 Tab 6

## 2019-04-20 RX ORDER — ESOMEPRAZOLE MAGNESIUM 40 MG/1
CAPSULE, DELAYED RELEASE ORAL
Qty: 90 CAP | Refills: 1 | Status: SHIPPED | OUTPATIENT
Start: 2019-04-20 | End: 2019-04-29 | Stop reason: SDUPTHER

## 2019-04-20 RX ORDER — PRAVASTATIN SODIUM 20 MG/1
TABLET ORAL
Qty: 90 TAB | Refills: 1 | Status: SHIPPED | OUTPATIENT
Start: 2019-04-20 | End: 2019-09-30 | Stop reason: SDUPTHER

## 2019-04-25 ENCOUNTER — HOSPITAL ENCOUNTER (OUTPATIENT)
Dept: LAB | Age: 59
Discharge: HOME | End: 2019-04-25
Payer: COMMERCIAL

## 2019-04-25 DIAGNOSIS — Z12.11 SCREENING FOR COLON CANCER: ICD-10-CM

## 2019-04-25 PROCEDURE — 82274 ASSAY TEST FOR BLOOD FECAL: CPT

## 2019-04-27 LAB — HEMOCCULT STL QL IA: NEGATIVE

## 2019-04-29 ENCOUNTER — OFFICE VISIT (OUTPATIENT)
Dept: FAMILY MEDICINE CLINIC | Age: 59
End: 2019-04-29

## 2019-04-29 VITALS
HEIGHT: 72 IN | DIASTOLIC BLOOD PRESSURE: 84 MMHG | TEMPERATURE: 97.1 F | HEART RATE: 59 BPM | RESPIRATION RATE: 16 BRPM | SYSTOLIC BLOOD PRESSURE: 122 MMHG | WEIGHT: 231 LBS | OXYGEN SATURATION: 96 % | BODY MASS INDEX: 31.29 KG/M2

## 2019-04-29 DIAGNOSIS — I10 ESSENTIAL HYPERTENSION: Primary | ICD-10-CM

## 2019-04-29 RX ORDER — ESOMEPRAZOLE MAGNESIUM 40 MG/1
CAPSULE, DELAYED RELEASE ORAL
Qty: 90 CAP | Refills: 1 | Status: SHIPPED | OUTPATIENT
Start: 2019-04-29 | End: 2019-09-30 | Stop reason: SDUPTHER

## 2019-04-29 NOTE — PROGRESS NOTES
HISTORY OF PRESENT ILLNESS 
Nadir Allison is a 60 y.o. male here for follow-up of hypertension. Patient's blood pressures are improved averaging about 135/85. He has been on 100 mg of losartan but for approximately 3 weeks. Gin Rinaldi Hypertension The history is provided by the patient. This is a chronic problem. The problem has been gradually improving. Associated symptoms include anxiety. Pertinent negatives include no orthopnea, no blurred vision, no peripheral edema and no dizziness. There are no associated agents to hypertension. Risk factors include no risk factors. Allergies Allergen Reactions  Doxycycline Anaphylaxis  Keflex [Cephalexin] Anaphylaxis  Pcn [Penicillins] Hives Current Outpatient Medications on File Prior to Visit Medication Sig Dispense Refill  pravastatin (PRAVACHOL) 20 mg tablet take 1 tablet by mouth once daily at bedtime 90 Tab 1  
 nebivolol (BYSTOLIC) 5 mg tablet take 1 tablet by mouth once daily 30 Tab 6  
 tolterodine ER (DETROL LA) 4 mg ER capsule Take 1 Cap by mouth daily. 90 Cap 3  
 losartan (COZAAR) 100 mg tablet Take 1 Tab by mouth daily. 30 Tab 6  
 raNITIdine (ZANTAC) 150 mg tablet Take 150 mg by mouth nightly.  aspirin delayed-release 81 mg tablet Take 81 mg by mouth daily.  lysine (L-LYSINE) 500 mg tab tablet Take 500 mg by mouth daily.  methylcellulose (CITRUCEL) 500 mg tablet Take 1,000 mg by mouth nightly.  multivitamin (MEN'S MULTI-VITAMIN) tablet Take 1 Tab by mouth daily. No current facility-administered medications on file prior to visit. Past Medical History:  
Diagnosis Date  Chronic pain Joint pain  GERD (gastroesophageal reflux disease)  Hypertension  IBS (irritable bowel syndrome)  Nocturia  OAB (overactive bladder)  Urinary frequency  Urinary urgency Past Surgical History:  
Procedure Laterality Date  HX ACL RECONSTRUCTION Right   
 knee  HX ACL RECONSTRUCTION Bilateral   
 HX HEENT    
 cyst on jaw removed  HX ORTHOPAEDIC    
 HX OTHER SURGICAL Left   
 mass removed from left shoulder  HX OTHER SURGICAL    
 3 dental implants  HX UROLOGICAL    
 vasectomy Family History Problem Relation Age of Onset  Lupus Mother  Heart Disease Father  Hypertension Father  Cancer Father   
     skin cancer  Cancer Brother   
     bone marrow; cll  Cancer Maternal Aunt   
     bone marrow; cll  Cancer Maternal Uncle   
     bone marrow; cll Social History Socioeconomic History  Marital status:  Spouse name: Not on file  Number of children: Not on file  Years of education: Not on file  Highest education level: Not on file Occupational History  Not on file Social Needs  Financial resource strain: Not on file  Food insecurity:  
  Worry: Not on file Inability: Not on file  Transportation needs:  
  Medical: Not on file Non-medical: Not on file Tobacco Use  Smoking status: Never Smoker  Smokeless tobacco: Never Used Substance and Sexual Activity  Alcohol use: Yes Comment: occasionally  Drug use: No  
 Sexual activity: Yes  
  Partners: Female Birth control/protection: None Lifestyle  Physical activity:  
  Days per week: Not on file Minutes per session: Not on file  Stress: Not on file Relationships  Social connections:  
  Talks on phone: Not on file Gets together: Not on file Attends Evangelical service: Not on file Active member of club or organization: Not on file Attends meetings of clubs or organizations: Not on file Relationship status: Not on file  Intimate partner violence:  
  Fear of current or ex partner: Not on file Emotionally abused: Not on file Physically abused: Not on file Forced sexual activity: Not on file Other Topics Concern  Not on file Social History Narrative  Not on file Review of Systems Constitutional: Negative. Eyes: Negative. Negative for blurred vision. Respiratory: Negative. Cardiovascular: Negative. Negative for orthopnea. Musculoskeletal: Negative. Neurological: Negative. Negative for dizziness. Endo/Heme/Allergies: Negative. Psychiatric/Behavioral: Negative. Visit Vitals /84 (BP 1 Location: Right arm, BP Patient Position: Sitting) Pulse (!) 59 Temp 97.1 °F (36.2 °C) (Temporal) Resp 16 Ht 6' (1.829 m) Wt 231 lb (104.8 kg) SpO2 96% BMI 31.33 kg/m² Physical Exam  
Constitutional: He is oriented to person, place, and time. He appears well-developed and well-nourished. HENT:  
Head: Normocephalic and atraumatic. Cardiovascular: Normal rate, regular rhythm, normal heart sounds and intact distal pulses. Exam reveals no gallop and no friction rub. No murmur heard. Pulmonary/Chest: Effort normal and breath sounds normal. No respiratory distress. He has no wheezes. He has no rales. Musculoskeletal: Normal range of motion. He exhibits no edema or tenderness. Neurological: He is alert and oriented to person, place, and time. No cranial nerve deficit. Coordination normal.  
Skin: Skin is warm and dry. No rash noted. No erythema. No pallor. Psychiatric: He has a normal mood and affect. His behavior is normal. Thought content normal.  
Nursing note and vitals reviewed. ASSESSMENT and PLAN 
  ICD-10-CM ICD-9-CM 1. Essential hypertension I10 401.9 Follow-up and Dispositions · Return in about 6 months (around 10/29/2019). current treatment plan is effective, no change in therapy

## 2019-04-29 NOTE — PROGRESS NOTES
Omar Alexis is a 60 y.o. male (: 1960) presenting to address: Chief Complaint Patient presents with  Hypertension BP READING(S) REPORT Vitals:  
 19 1002 BP: 122/84 Pulse: (!) 59 Resp: 16 Temp: 97.1 °F (36.2 °C) TempSrc: Temporal  
SpO2: 96% Weight: 231 lb (104.8 kg) Height: 6' (1.829 m) PainSc:   0 - No pain Hearing/Vision: No exam data present Learning Assessment:  
 
Learning Assessment 2019 PRIMARY LEARNER Patient PRIMARY LANGUAGE ENGLISH  
LEARNER PREFERENCE PRIMARY PICTURES  
  LISTENING  
  VIDEOS  
  DEMONSTRATION  
ANSWERED BY patient RELATIONSHIP SELF Depression Screening:  
 
3 most recent PHQ Screens 2019 Little interest or pleasure in doing things Not at all Feeling down, depressed, irritable, or hopeless Not at all Total Score PHQ 2 0 Fall Risk Assessment:  
 
Fall Risk Assessment, last 12 mths 2019 Able to walk? Yes Fall in past 12 months? No  
 
Abuse Screening:  
 
Abuse Screening Questionnaire 2019 Do you ever feel afraid of your partner? Quita Bello Are you in a relationship with someone who physically or mentally threatens you? Quita Bello Is it safe for you to go home? Maynor Fry Coordination of Care Questionaire: 1. Have you been to the ER, urgent care clinic since your last visit? Hospitalized since your last visit? NO 
 
2. Have you seen or consulted any other health care providers outside of the [address] [place] [address] since your last visit? Include any pap smears or colon screening.  NO

## 2019-04-30 ENCOUNTER — TELEPHONE (OUTPATIENT)
Dept: FAMILY MEDICINE CLINIC | Age: 59
End: 2019-04-30

## 2019-04-30 NOTE — TELEPHONE ENCOUNTER
Pt's wife stated esomeprazole (NEXIUM) 40 mg capsule requires auth. Please call [phone] opt 2, 0.

## 2019-04-30 NOTE — TELEPHONE ENCOUNTER
Shirlene Horvath (Marsh: [id number]) - [phone]  Esomeprazole Magnesium 40MG OR CPDR  Status: [place] Response - Approved    Created: April 20th, 2019 [phone]    Sent: April 30th, 2019    Open  Archive

## 2019-09-11 ENCOUNTER — OFFICE VISIT (OUTPATIENT)
Dept: FAMILY MEDICINE CLINIC | Age: 59
End: 2019-09-11

## 2019-09-11 VITALS
WEIGHT: 231.6 LBS | DIASTOLIC BLOOD PRESSURE: 81 MMHG | BODY MASS INDEX: 31.37 KG/M2 | HEIGHT: 72 IN | HEART RATE: 67 BPM | SYSTOLIC BLOOD PRESSURE: 122 MMHG | TEMPERATURE: 97.3 F | OXYGEN SATURATION: 95 % | RESPIRATION RATE: 20 BRPM

## 2019-09-11 DIAGNOSIS — R05.9 COUGH: ICD-10-CM

## 2019-09-11 DIAGNOSIS — J06.9 UPPER RESPIRATORY TRACT INFECTION, UNSPECIFIED TYPE: Primary | ICD-10-CM

## 2019-09-11 RX ORDER — AZITHROMYCIN 250 MG/1
TABLET, FILM COATED ORAL
Qty: 6 TAB | Refills: 0 | Status: SHIPPED | OUTPATIENT
Start: 2019-09-11 | End: 2019-09-16

## 2019-09-11 RX ORDER — BENZONATATE 100 MG/1
100 CAPSULE ORAL
Qty: 30 CAP | Refills: 0 | Status: SHIPPED | OUTPATIENT
Start: 2019-09-11 | End: 2019-09-18

## 2019-09-11 NOTE — PROGRESS NOTES
Subjective:   Adolfo Renae is a 60 y.o. male who complains of congestion, post nasal drip and dry cough for 9 days, gradually worsening since that time. He denies a history of shortness of breath and wheezing. Evaluation to date: none. Treatment to date: OTC products. Patient does not smoke cigarettes. Relevant PMH: No pertinent additional PMH. Current Outpatient Medications   Medication Sig Dispense Refill    azithromycin (ZITHROMAX) 250 mg tablet Take 2 tablets today, then take 1 tablet daily 6 Tab 0    benzonatate (TESSALON) 100 mg capsule Take 1 Cap by mouth three (3) times daily as needed for Cough for up to 7 days. 30 Cap 0    esomeprazole (NEXIUM) 40 mg capsule take 1 capsule by mouth once daily 90 Cap 1    pravastatin (PRAVACHOL) 20 mg tablet take 1 tablet by mouth once daily at bedtime 90 Tab 1    nebivolol (BYSTOLIC) 5 mg tablet take 1 tablet by mouth once daily 30 Tab 6    tolterodine ER (DETROL LA) 4 mg ER capsule Take 1 Cap by mouth daily. 90 Cap 3    losartan (COZAAR) 100 mg tablet Take 1 Tab by mouth daily. 30 Tab 6    raNITIdine (ZANTAC) 150 mg tablet Take 150 mg by mouth nightly.  aspirin delayed-release 81 mg tablet Take 81 mg by mouth daily.  lysine (L-LYSINE) 500 mg tab tablet Take 500 mg by mouth daily.  multivitamin (MEN'S MULTI-VITAMIN) tablet Take 1 Tab by mouth daily.  methylcellulose (CITRUCEL) 500 mg tablet Take 1,000 mg by mouth nightly. Allergies   Allergen Reactions    Doxycycline Anaphylaxis    Keflex [Cephalexin] Anaphylaxis    Pcn [Penicillins] Hives        Review of Systems  Pertinent items are noted in HPI.     Objective:     Visit Vitals  /81 (BP 1 Location: Right arm, BP Patient Position: Sitting)   Pulse 67   Temp 97.3 °F (36.3 °C) (Oral)   Resp 20   Ht 6' (1.829 m)   Wt 231 lb 9.6 oz (105.1 kg)   SpO2 95%   BMI 31.41 kg/m²     General:  alert, cooperative, no distress   Eyes: negative   Ears: abnormal TM AD - erythematous   Sinuses: Normal paranasal sinuses without tenderness   Mouth:  Lips, mucosa, and tongue normal. Teeth and gums normal   Neck: supple, symmetrical, trachea midline and mild anterior cervical adenopathy. Heart: S1 and S2 normal, no murmurs noted. Lungs: clear to auscultation bilaterally   Abdomen: soft, non-tender. Bowel sounds normal. No masses,  no organomegaly        Assessment/Plan:   viral upper respiratory illness  cough  Encounter Diagnoses   Name Primary?  Upper respiratory tract infection, unspecified type Yes    Cough      Orders Placed This Encounter    azithromycin (ZITHROMAX) 250 mg tablet    benzonatate (TESSALON) 100 mg capsule   .

## 2019-09-11 NOTE — PATIENT INSTRUCTIONS

## 2019-09-11 NOTE — PROGRESS NOTES
Mulu Medellin is a 60 y.o. male (: 1960) presenting to address:    Chief Complaint   Patient presents with    Cough     x nine days       pain scale 0/10    Nasal Congestion       Vitals:    19 1538   BP: 122/81   Pulse: 67   Resp: 20   Temp: 97.3 °F (36.3 °C)   TempSrc: Oral   SpO2: 95%   Weight: 231 lb 9.6 oz (105.1 kg)   Height: 6' (1.829 m)   PainSc:   0 - No pain       Hearing/Vision:   No exam data present    Learning Assessment:     Learning Assessment 2019   PRIMARY LEARNER Patient   PRIMARY LANGUAGE ENGLISH   LEARNER PREFERENCE PRIMARY PICTURES     LISTENING     VIDEOS     DEMONSTRATION   ANSWERED BY patient   RELATIONSHIP SELF     Depression Screening:     3 most recent PHQ Screens 2019   Little interest or pleasure in doing things Not at all   Feeling down, depressed, irritable, or hopeless Not at all   Total Score PHQ 2 0     Fall Risk Assessment:     Fall Risk Assessment, last 12 mths 2019   Able to walk? Yes   Fall in past 12 months? No     Abuse Screening:     Abuse Screening Questionnaire 2019   Do you ever feel afraid of your partner? N   Are you in a relationship with someone who physically or mentally threatens you? N   Is it safe for you to go home? Y     Coordination of Care Questionaire:   1. Have you been to the ER, urgent care clinic since your last visit? Hospitalized since your last visit? NO    2. Have you seen or consulted any other health care providers outside of the [address] [place] [address] since your last visit? Include any pap smears or colon screening. NO    Advanced Directive:   1. Do you have an Advanced Directive? NO    2. Would you like information on Advanced Directives?  NO

## 2019-09-30 ENCOUNTER — OFFICE VISIT (OUTPATIENT)
Dept: FAMILY MEDICINE CLINIC | Age: 59
End: 2019-09-30

## 2019-09-30 VITALS
HEIGHT: 72 IN | OXYGEN SATURATION: 94 % | RESPIRATION RATE: 15 BRPM | HEART RATE: 64 BPM | DIASTOLIC BLOOD PRESSURE: 70 MMHG | BODY MASS INDEX: 31.29 KG/M2 | WEIGHT: 231 LBS | TEMPERATURE: 97.5 F | SYSTOLIC BLOOD PRESSURE: 134 MMHG

## 2019-09-30 DIAGNOSIS — R05.9 COUGH: Primary | ICD-10-CM

## 2019-09-30 DIAGNOSIS — Z23 ENCOUNTER FOR IMMUNIZATION: ICD-10-CM

## 2019-09-30 DIAGNOSIS — I10 ESSENTIAL HYPERTENSION: ICD-10-CM

## 2019-09-30 DIAGNOSIS — E78.01 FAMILIAL HYPERCHOLESTEROLEMIA: ICD-10-CM

## 2019-09-30 RX ORDER — PRAVASTATIN SODIUM 20 MG/1
20 TABLET ORAL
Qty: 90 TAB | Refills: 1 | Status: SHIPPED | OUTPATIENT
Start: 2019-09-30 | End: 2020-05-29 | Stop reason: SDUPTHER

## 2019-09-30 RX ORDER — LOSARTAN POTASSIUM 100 MG/1
100 TABLET ORAL DAILY
Qty: 90 TAB | Refills: 1 | Status: SHIPPED | OUTPATIENT
Start: 2019-09-30 | End: 2020-05-29 | Stop reason: SDUPTHER

## 2019-09-30 RX ORDER — ESOMEPRAZOLE MAGNESIUM 40 MG/1
CAPSULE, DELAYED RELEASE ORAL
Qty: 90 CAP | Refills: 1 | Status: SHIPPED | OUTPATIENT
Start: 2019-09-30 | End: 2020-04-02 | Stop reason: SDUPTHER

## 2019-09-30 RX ORDER — NEBIVOLOL 5 MG/1
TABLET ORAL
Qty: 90 TAB | Refills: 1 | Status: SHIPPED | OUTPATIENT
Start: 2019-09-30 | End: 2020-04-02 | Stop reason: SDUPTHER

## 2019-09-30 NOTE — PROGRESS NOTES
Carine Coats is a 60 y.o. male (: 1960) presenting to address:    Chief Complaint   Patient presents with    Hypertension     f/u    Cholesterol Problem     f/u       Vitals:    19 0925   BP: 134/70   Pulse: 64   Resp: 15   Temp: 97.5 °F (36.4 °C)   TempSrc: Temporal   SpO2: 94%   Weight: 231 lb (104.8 kg)   Height: 6' (1.829 m)   PainSc:   0 - No pain       Hearing/Vision:   No exam data present    Learning Assessment:     Learning Assessment 2019   PRIMARY LEARNER Patient   PRIMARY LANGUAGE ENGLISH   LEARNER PREFERENCE PRIMARY PICTURES     LISTENING     VIDEOS     DEMONSTRATION   ANSWERED BY patient   RELATIONSHIP SELF     Depression Screening:     3 most recent PHQ Screens 2019   Little interest or pleasure in doing things Not at all   Feeling down, depressed, irritable, or hopeless Not at all   Total Score PHQ 2 0     Fall Risk Assessment:     Fall Risk Assessment, last 12 mths 2019   Able to walk? Yes   Fall in past 12 months? No     Abuse Screening:     Abuse Screening Questionnaire 2019   Do you ever feel afraid of your partner? N   Are you in a relationship with someone who physically or mentally threatens you? N   Is it safe for you to go home? Y     Coordination of Care Questionaire:   1. Have you been to the ER, urgent care clinic since your last visit? Hospitalized since your last visit? NO    2. Have you seen or consulted any other health care providers outside of the [address] [place] [address] since your last visit? Include any pap smears or colon screening. NO    Flu Vaccine given on the Left Deltoid, IM    Immunization/s administered 2019 by Prachi Sanchez LPN with consent. Patient tolerated the procedure well. No reactions noted.

## 2019-09-30 NOTE — PROGRESS NOTES
HISTORY OF PRESENT ILLNESS  Lee Ann Delgado is a 60 y.o. male for follow-up on hypertension and hyperlipidemia. . Patient's main complaint is that of cough today. He has tried Claritin and Flonase with some reduction in cough but it is still present. He denies shortness of breath but it is noted that his O2 saturation is 94% today. Hypertension    The history is provided by the patient. This is a chronic problem. The problem has been gradually improving. Associated symptoms include anxiety. Pertinent negatives include no chest pain, no orthopnea, no blurred vision, no headaches, no peripheral edema, no dizziness and no shortness of breath. There are no associated agents to hypertension. Risk factors include no risk factors. Cholesterol Problem   The history is provided by the patient. This is a chronic problem. The problem has been gradually improving. Pertinent negatives include no chest pain, no abdominal pain, no headaches and no shortness of breath. The symptoms are aggravated by eating. The symptoms are relieved by medications. Treatments tried: Pravachol. Immunization/Injection   The history is provided by the medical records. Pertinent negatives include no chest pain, no abdominal pain, no headaches and no shortness of breath. Allergies   Allergen Reactions    Doxycycline Anaphylaxis    Keflex [Cephalexin] Anaphylaxis    Pcn [Penicillins] Hives     Current Outpatient Medications on File Prior to Visit   Medication Sig Dispense Refill    losartan (COZAAR) 100 mg tablet take 1 tablet by mouth once daily 90 Tab 1    esomeprazole (NEXIUM) 40 mg capsule take 1 capsule by mouth once daily 90 Cap 1    pravastatin (PRAVACHOL) 20 mg tablet take 1 tablet by mouth once daily at bedtime 90 Tab 1    nebivolol (BYSTOLIC) 5 mg tablet take 1 tablet by mouth once daily 30 Tab 6    tolterodine ER (DETROL LA) 4 mg ER capsule Take 1 Cap by mouth daily.  90 Cap 3    raNITIdine (ZANTAC) 150 mg tablet Take 150 mg by mouth nightly.  aspirin delayed-release 81 mg tablet Take 81 mg by mouth daily.  lysine (L-LYSINE) 500 mg tab tablet Take 500 mg by mouth daily.  multivitamin (MEN'S MULTI-VITAMIN) tablet Take 1 Tab by mouth daily. No current facility-administered medications on file prior to visit.       Past Medical History:   Diagnosis Date    Chronic pain     Joint pain    GERD (gastroesophageal reflux disease)     Hypertension     IBS (irritable bowel syndrome)     Nocturia     OAB (overactive bladder)     Urinary frequency     Urinary urgency      Past Surgical History:   Procedure Laterality Date    HX ACL RECONSTRUCTION Right     knee    HX ACL RECONSTRUCTION Bilateral     HX HEENT      cyst on jaw removed    HX ORTHOPAEDIC      HX OTHER SURGICAL Left     mass removed from left shoulder     HX OTHER SURGICAL      3 dental implants    HX UROLOGICAL      vasectomy     Family History   Problem Relation Age of Onset    Lupus Mother     Heart Disease Father     Hypertension Father     Cancer Father         skin cancer    Cancer Brother         bone marrow; cll    Cancer Maternal Aunt         bone marrow; cll    Cancer Maternal Uncle         bone marrow; cll     Social History     Socioeconomic History    Marital status:      Spouse name: Not on file    Number of children: Not on file    Years of education: Not on file    Highest education level: Not on file   Occupational History    Not on file   Social Needs    Financial resource strain: Not on file    Food insecurity:     Worry: Not on file     Inability: Not on file    Transportation needs:     Medical: Not on file     Non-medical: Not on file   Tobacco Use    Smoking status: Never Smoker    Smokeless tobacco: Never Used   Substance and Sexual Activity    Alcohol use: Yes     Comment: occasionally    Drug use: No    Sexual activity: Yes     Partners: Female     Birth control/protection: None   Lifestyle    Physical activity:     Days per week: Not on file     Minutes per session: Not on file    Stress: Not on file   Relationships    Social connections:     Talks on phone: Not on file     Gets together: Not on file     Attends Adventism service: Not on file     Active member of club or organization: Not on file     Attends meetings of clubs or organizations: Not on file     Relationship status: Not on file    Intimate partner violence:     Fear of current or ex partner: Not on file     Emotionally abused: Not on file     Physically abused: Not on file     Forced sexual activity: Not on file   Other Topics Concern    Not on file   Social History Narrative    Not on file         Review of Systems   Constitutional: Negative. Eyes: Negative. Negative for blurred vision. Respiratory: Negative. Negative for shortness of breath. Cardiovascular: Negative. Negative for chest pain and orthopnea. Gastrointestinal: Negative for abdominal pain. Musculoskeletal: Negative. Neurological: Negative. Negative for dizziness and headaches. Endo/Heme/Allergies: Negative. Psychiatric/Behavioral: Negative. Visit Vitals  /70 (BP 1 Location: Left arm, BP Patient Position: Sitting)   Pulse 64   Temp 97.5 °F (36.4 °C) (Temporal)   Resp 15   Ht 6' (1.829 m)   Wt 231 lb (104.8 kg)   SpO2 94%   BMI 31.33 kg/m²       Physical Exam   Constitutional: He is oriented to person, place, and time. He appears well-developed and well-nourished. HENT:   Head: Normocephalic and atraumatic. Cardiovascular: Normal rate, regular rhythm, normal heart sounds and intact distal pulses. Exam reveals no gallop and no friction rub. No murmur heard. Pulmonary/Chest: Effort normal and breath sounds normal. No respiratory distress. He has no wheezes. He has no rales. Musculoskeletal: Normal range of motion. He exhibits no edema or tenderness. Neurological: He is alert and oriented to person, place, and time.  No cranial nerve deficit. Coordination normal.   Skin: Skin is warm and dry. No rash noted. No erythema. No pallor. Psychiatric: He has a normal mood and affect. His behavior is normal. Thought content normal.   Nursing note and vitals reviewed. ASSESSMENT and PLAN    ICD-10-CM ICD-9-CM    1. Cough R05 786.2 XR CHEST PA LAT      CBC WITH AUTOMATED DIFF   2. Essential hypertension V38 922.5 METABOLIC PANEL, COMPREHENSIVE   3. Familial hypercholesterolemia O87.37 125.7 METABOLIC PANEL, COMPREHENSIVE      LIPID PANEL   4. Encounter for immunization Z23 V03.89 INFLUENZA VIRUS VAC QUAD,SPLIT,PRESV FREE SYRINGE IM   Patient has been instructed to go to emergency room if he develops shortness of breath. Follow-up and Dispositions    · Return in about 6 months (around 3/30/2020).

## 2019-10-14 DIAGNOSIS — R05.9 COUGH: Primary | ICD-10-CM

## 2019-10-25 ENCOUNTER — HOSPITAL ENCOUNTER (OUTPATIENT)
Dept: LAB | Age: 59
Discharge: HOME | End: 2019-10-25
Payer: COMMERCIAL

## 2019-10-25 DIAGNOSIS — I10 ESSENTIAL HYPERTENSION: ICD-10-CM

## 2019-10-25 DIAGNOSIS — E78.01 FAMILIAL HYPERCHOLESTEROLEMIA: ICD-10-CM

## 2019-10-25 DIAGNOSIS — R05.9 COUGH: ICD-10-CM

## 2019-10-25 LAB
ALBUMIN SERPL-MCNC: 4.4 G/DL (ref 3.4–5)
ALBUMIN/GLOB SERPL: 1.8 {RATIO} (ref 0.8–1.7)
ALP SERPL-CCNC: 64 U/L (ref 45–117)
ALT SERPL-CCNC: 62 U/L (ref 16–61)
ANION GAP SERPL CALC-SCNC: 8 MMOL/L (ref 3–18)
AST SERPL-CCNC: 24 U/L (ref 10–38)
BASOPHILS # BLD: 0 K/UL (ref 0–0.1)
BASOPHILS NFR BLD: 1 % (ref 0–2)
BILIRUB SERPL-MCNC: 0.7 MG/DL (ref 0.2–1)
BUN SERPL-MCNC: 17 MG/DL (ref 7–18)
BUN/CREAT SERPL: 17 (ref 12–20)
CALCIUM SERPL-MCNC: 8.9 MG/DL (ref 8.5–10.1)
CHLORIDE SERPL-SCNC: 106 MMOL/L (ref 100–111)
CO2 SERPL-SCNC: 26 MMOL/L (ref 21–32)
CREAT SERPL-MCNC: 1 MG/DL (ref 0.6–1.3)
DIFFERENTIAL METHOD BLD: NORMAL
EOSINOPHIL # BLD: 0.2 K/UL (ref 0–0.4)
EOSINOPHIL NFR BLD: 3 % (ref 0–5)
ERYTHROCYTE [DISTWIDTH] IN BLOOD BY AUTOMATED COUNT: 12.8 % (ref 11.6–14.5)
GLOBULIN SER CALC-MCNC: 2.5 G/DL (ref 2–4)
GLUCOSE SERPL-MCNC: 103 MG/DL (ref 74–99)
HCT VFR BLD AUTO: 46.8 % (ref 36–48)
HGB BLD-MCNC: 15.8 G/DL (ref 13–16)
LYMPHOCYTES # BLD: 2.5 K/UL (ref 0.9–3.6)
LYMPHOCYTES NFR BLD: 38 % (ref 21–52)
MCH RBC QN AUTO: 30.3 PG (ref 24–34)
MCHC RBC AUTO-ENTMCNC: 33.8 G/DL (ref 31–37)
MCV RBC AUTO: 89.8 FL (ref 74–97)
MONOCYTES # BLD: 0.6 K/UL (ref 0.05–1.2)
MONOCYTES NFR BLD: 9 % (ref 3–10)
NEUTS SEG # BLD: 3.3 K/UL (ref 1.8–8)
NEUTS SEG NFR BLD: 49 % (ref 40–73)
PLATELET # BLD AUTO: 223 K/UL (ref 135–420)
PMV BLD AUTO: 11.7 FL (ref 9.2–11.8)
POTASSIUM SERPL-SCNC: 4.3 MMOL/L (ref 3.5–5.5)
PROT SERPL-MCNC: 6.9 G/DL (ref 6.4–8.2)
RBC # BLD AUTO: 5.21 M/UL (ref 4.7–5.5)
SODIUM SERPL-SCNC: 140 MMOL/L (ref 136–145)
WBC # BLD AUTO: 6.6 K/UL (ref 4.6–13.2)

## 2019-10-25 PROCEDURE — 80053 COMPREHEN METABOLIC PANEL: CPT

## 2019-10-25 PROCEDURE — 80061 LIPID PANEL: CPT

## 2019-10-25 PROCEDURE — 36415 COLL VENOUS BLD VENIPUNCTURE: CPT

## 2019-10-25 PROCEDURE — 85025 COMPLETE CBC W/AUTO DIFF WBC: CPT

## 2019-10-26 LAB
CHOLEST SERPL-MCNC: 160 MG/DL
HDLC SERPL-MCNC: 40 MG/DL (ref 40–60)
HDLC SERPL: 4 {RATIO} (ref 0–5)
LDLC SERPL CALC-MCNC: 90.6 MG/DL (ref 0–100)
LIPID PROFILE,FLP: NORMAL
TRIGL SERPL-MCNC: 147 MG/DL (ref ?–150)
VLDLC SERPL CALC-MCNC: 29.4 MG/DL

## 2020-01-02 PROBLEM — R39.15 URINARY URGENCY: Status: ACTIVE | Noted: 2020-01-02

## 2020-01-02 PROBLEM — N32.81 OAB (OVERACTIVE BLADDER): Status: ACTIVE | Noted: 2020-01-02

## 2020-01-02 PROBLEM — N13.8 BPH WITH OBSTRUCTION/LOWER URINARY TRACT SYMPTOMS: Status: ACTIVE | Noted: 2020-01-02

## 2020-01-02 PROBLEM — N40.1 BPH WITH OBSTRUCTION/LOWER URINARY TRACT SYMPTOMS: Status: ACTIVE | Noted: 2020-01-02

## 2020-05-29 ENCOUNTER — TELEPHONE (OUTPATIENT)
Dept: FAMILY MEDICINE CLINIC | Age: 60
End: 2020-05-29

## 2020-05-29 RX ORDER — ESOMEPRAZOLE MAGNESIUM 40 MG/1
CAPSULE, DELAYED RELEASE ORAL
Qty: 30 CAP | Refills: 0 | Status: SHIPPED | OUTPATIENT
Start: 2020-05-29 | End: 2020-07-29 | Stop reason: SDUPTHER

## 2020-05-29 RX ORDER — PRAVASTATIN SODIUM 20 MG/1
20 TABLET ORAL
Qty: 90 TAB | Refills: 1 | Status: SHIPPED | OUTPATIENT
Start: 2020-05-29 | End: 2020-11-24

## 2020-05-29 RX ORDER — NEBIVOLOL 5 MG/1
TABLET ORAL
Qty: 30 TAB | Refills: 1 | Status: SHIPPED | OUTPATIENT
Start: 2020-05-29 | End: 2020-07-29 | Stop reason: SDUPTHER

## 2020-05-29 RX ORDER — LOSARTAN POTASSIUM 100 MG/1
100 TABLET ORAL DAILY
Qty: 90 TAB | Refills: 1 | Status: SHIPPED | OUTPATIENT
Start: 2020-05-29 | End: 2021-03-03 | Stop reason: SDUPTHER

## 2020-05-29 NOTE — TELEPHONE ENCOUNTER
Future Appointments   Date Time Provider Niya Wellington   6/3/2020  1:00 PM Long Parra MD [address] [facility] 1788   11/30/2020  8:00 AM Selene Chanel MD [facility]

## 2020-05-29 NOTE — TELEPHONE ENCOUNTER
Patient is requesting lab order before appt for physical   Future Appointments   Date Time Provider Niya Wellington   6/24/2020  2:15 PM Natalia Perkins MD [address] [facility] 1235   11/30/2020  8:00 AM Tim Mckay MD [facility] OpenPM

## 2020-06-01 DIAGNOSIS — I10 ESSENTIAL HYPERTENSION: Primary | ICD-10-CM

## 2020-06-01 DIAGNOSIS — E78.01 FAMILIAL HYPERCHOLESTEROLEMIA: ICD-10-CM

## 2020-06-01 NOTE — TELEPHONE ENCOUNTER
I can order labs but make him aware of the fact that he may need additional labs drawn after appointment

## 2020-06-18 ENCOUNTER — VIRTUAL VISIT (OUTPATIENT)
Dept: FAMILY MEDICINE CLINIC | Age: 60
End: 2020-06-18

## 2020-06-18 DIAGNOSIS — K21.9 GASTROESOPHAGEAL REFLUX DISEASE WITHOUT ESOPHAGITIS: ICD-10-CM

## 2020-06-18 DIAGNOSIS — R42 DIZZINESS: ICD-10-CM

## 2020-06-18 DIAGNOSIS — E78.01 FAMILIAL HYPERCHOLESTEROLEMIA: ICD-10-CM

## 2020-06-18 DIAGNOSIS — G44.52 NEW DAILY PERSISTENT HEADACHE: ICD-10-CM

## 2020-06-18 DIAGNOSIS — I10 ESSENTIAL HYPERTENSION: ICD-10-CM

## 2020-06-18 DIAGNOSIS — R07.81 PLEURITIC CHEST PAIN: Primary | ICD-10-CM

## 2020-06-18 RX ORDER — UREA 10 %
100 LOTION (ML) TOPICAL DAILY
COMMUNITY

## 2020-06-18 NOTE — PROGRESS NOTES
HISTORY OF PRESENT ILLNESS  Ariane Monique is a 60 y.o. male today with complaints of pleuritic chest pain for 1 week which is now resolved. Patient states that he has a history of a collapsed lung in the past.  He is also complaining of a headache with dizziness and a sense of cloudiness. He sometimes feels off balance. He also states that he is having worsening GERD. He states that his blood pressures have been well controlled at 135/70. .     Consent:  he and/or  healthcare decision maker is aware that this patient-initiated Telehealth encounter is a billable service, with coverage as determined by her insurance carrier. he is aware that she may receive a bill and has provided verbal consent to proceed: Yes    I was at home while conducting this encounter. Hypertension    The history is provided by the patient. This is a chronic problem. The problem has been gradually improving. Associated symptoms include chest pain, headaches and dizziness. Pertinent negatives include no orthopnea, no anxiety, no blurred vision, no peripheral edema and no shortness of breath. There are no associated agents to hypertension. Risk factors include no risk factors. Cholesterol Problem   The history is provided by the patient. This is a chronic problem. The problem has been gradually improving. Associated symptoms include chest pain and headaches. Pertinent negatives include no abdominal pain and no shortness of breath. The symptoms are aggravated by eating. The symptoms are relieved by medications. Treatments tried: Pravachol. Headache   The history is provided by the patient. This is a new problem. The problem has not changed since onset. Associated symptoms include chest pain and headaches. Pertinent negatives include no abdominal pain and no shortness of breath. Nothing aggravates the symptoms. Nothing relieves the symptoms. He has tried nothing for the symptoms.    Dizziness    The history is provided by the patient. This is a new problem. The problem has not changed since onset. Associated symptoms include chest pain, headaches and dizziness. Pertinent negatives include no visual change, no abdominal pain, no congestion, no seizures and no slurred speech. He has tried nothing for the symptoms. His past medical history is significant for HTN. His past medical history does not include no CVA, no TIA or no DM. Other   The history is provided by the patient (Patient is complaining of pleuritic chest pain which is now resolved. States that he has history of collapsed lung in the past). The problem has been resolved. Associated symptoms include chest pain and headaches. Pertinent negatives include no abdominal pain and no shortness of breath. Nothing aggravates the symptoms. Nothing relieves the symptoms. He has tried nothing for the symptoms. GERD   The history is provided by the patient. This is a recurrent problem. The problem has been gradually worsening. Associated symptoms include chest pain and headaches. Pertinent negatives include no abdominal pain and no shortness of breath. The symptoms are aggravated by eating. The symptoms are relieved by medications. Treatments tried: Nexium. The treatment provided moderate relief. Allergies   Allergen Reactions    Doxycycline Anaphylaxis    Keflex [Cephalexin] Anaphylaxis    Pcn [Penicillins] Hives     Current Outpatient Medications on File Prior to Visit   Medication Sig Dispense Refill    cyanocobalamin (Vitamin B-12) 100 mcg tablet Take 100 mcg by mouth daily.  pravastatin (PRAVACHOL) 20 mg tablet Take 1 Tab by mouth nightly. 90 Tab 1    losartan (COZAAR) 100 mg tablet Take 1 Tab by mouth daily. 90 Tab 1    nebivoloL (Bystolic) 5 mg tablet take 1 tablet by mouth once daily 30 Tab 1    esomeprazole (NEXIUM) 40 mg capsule take 1 capsule by mouth once daily 30 Cap 0    tolterodine ER (DETROL LA) 4 mg ER capsule Take 1 Cap by mouth daily.  90 Cap 3    aspirin delayed-release 81 mg tablet Take 81 mg by mouth daily.  lysine (L-LYSINE) 500 mg tab tablet Take 500 mg by mouth daily.  multivitamin (MEN'S MULTI-VITAMIN) tablet Take 1 Tab by mouth daily.  raNITIdine (ZANTAC) 150 mg tablet Take 150 mg by mouth nightly. No current facility-administered medications on file prior to visit.       Past Medical History:   Diagnosis Date    Chronic pain     Joint pain    GERD (gastroesophageal reflux disease)     Hypertension     IBS (irritable bowel syndrome)     Nocturia     OAB (overactive bladder)     Urinary frequency     Urinary urgency     Urine leukocytes      Past Surgical History:   Procedure Laterality Date    HX ACL RECONSTRUCTION Right     knee    HX ACL RECONSTRUCTION Bilateral     HX HEENT      cyst on jaw removed    HX ORTHOPAEDIC      HX OTHER SURGICAL Left     mass removed from left shoulder     HX OTHER SURGICAL      3 dental implants    HX UROLOGICAL      vasectomy     Family History   Problem Relation Age of Onset    Lupus Mother     Heart Disease Father     Hypertension Father     Cancer Father         skin cancer    Cancer Brother         bone marrow; cll    Cancer Maternal Aunt         bone marrow; cll    Cancer Maternal Uncle         bone marrow; cll     Social History     Socioeconomic History    Marital status:      Spouse name: Not on file    Number of children: Not on file    Years of education: Not on file    Highest education level: Not on file   Occupational History    Not on file   Social Needs    Financial resource strain: Not on file    Food insecurity     Worry: Not on file     Inability: Not on file    Transportation needs     Medical: Not on file     Non-medical: Not on file   Tobacco Use    Smoking status: Never Smoker    Smokeless tobacco: Never Used   Substance and Sexual Activity    Alcohol use: Yes     Comment: occasionally    Drug use: No    Sexual activity: Yes Partners: Female     Birth control/protection: None   Lifestyle    Physical activity     Days per week: Not on file     Minutes per session: Not on file    Stress: Not on file   Relationships    Social connections     Talks on phone: Not on file     Gets together: Not on file     Attends Adventism service: Not on file     Active member of club or organization: Not on file     Attends meetings of clubs or organizations: Not on file     Relationship status: Not on file    Intimate partner violence     Fear of current or ex partner: Not on file     Emotionally abused: Not on file     Physically abused: Not on file     Forced sexual activity: Not on file   Other Topics Concern    Not on file   Social History Narrative    Not on file         Review of Systems   Constitutional: Negative. HENT: Negative for congestion. Eyes: Negative. Negative for blurred vision. Respiratory: Negative. Negative for shortness of breath. Cardiovascular: Positive for chest pain. Negative for orthopnea. Gastrointestinal: Negative for abdominal pain. Musculoskeletal: Negative. Neurological: Positive for dizziness and headaches. Negative for seizures. Endo/Heme/Allergies: Negative. Psychiatric/Behavioral: Negative. There were no vitals taken for this visit. Physical Exam  Constitutional:       Appearance: He is well-developed. HENT:      Head: Normocephalic and atraumatic. Pulmonary:      Effort: Pulmonary effort is normal. No respiratory distress. Musculoskeletal: Normal range of motion. General: No tenderness. Skin:     General: Skin is warm and dry. Coloration: Skin is not pale. Findings: No erythema or rash. Neurological:      Mental Status: He is alert and oriented to person, place, and time. Cranial Nerves: No cranial nerve deficit. Coordination: Coordination normal.   Psychiatric:         Behavior: Behavior normal.         Thought Content:  Thought content normal. ASSESSMENT and PLAN    ICD-10-CM ICD-9-CM    1. Pleuritic chest pain R07.81 786.52 XR CHEST COMP 4 V   2. New daily persistent headache G44.52 339.42 CT HEAD WO CONT   3. Dizziness R42 780.4 CT HEAD WO CONT   4. Essential hypertension I10 401.9    5. Gastroesophageal reflux disease without esophagitis K21.9 530.81    6. Familial hypercholesterolemia E78.01 272.0    Time spent was more than 40 minutes. Greater than 50% of which was spent counseling. We discussed the expected course, resolution and complications of the diagnosis(es) in detail. Medication risks, benefits, costs, interactions, and alternatives were discussed as indicated. I advised her to contact the office if her condition worsens, changes or fails to improve as anticipated. She expressed understanding with the diagnosis(es) and plan. Pursuant to the emergency declaration under the [address] [place] [address], [facility] waiver authority and the [employer] Act, this Virtual  Visit was conducted, with patient's consent, to reduce the patient's risk of exposure to COVID-19 and provide continuity of care for an established patient. Services were provided through a video synchronous discussion virtually to substitute for in-person clinic visit. Pascual Felton MD      Follow-up and Dispositions    · Return in about 2 weeks (around 7/2/2020).

## 2020-06-24 ENCOUNTER — HOSPITAL ENCOUNTER (OUTPATIENT)
Dept: CT IMAGING | Age: 60
Discharge: HOME | End: 2020-06-24
Attending: INTERNAL MEDICINE
Payer: COMMERCIAL

## 2020-06-24 ENCOUNTER — HOSPITAL ENCOUNTER (OUTPATIENT)
Dept: GENERAL RADIOLOGY | Age: 60
Discharge: HOME | End: 2020-06-24
Attending: INTERNAL MEDICINE
Payer: COMMERCIAL

## 2020-06-24 DIAGNOSIS — G44.52 NEW DAILY PERSISTENT HEADACHE: ICD-10-CM

## 2020-06-24 DIAGNOSIS — R42 DIZZINESS: ICD-10-CM

## 2020-06-24 DIAGNOSIS — R07.81 PLEURITIC CHEST PAIN: ICD-10-CM

## 2020-06-24 PROCEDURE — 71048 X-RAY EXAM CHEST 4+ VIEWS: CPT

## 2020-06-24 PROCEDURE — 70450 CT HEAD/BRAIN W/O DYE: CPT

## 2020-06-25 ENCOUNTER — HOSPITAL ENCOUNTER (OUTPATIENT)
Dept: LAB | Age: 60
Discharge: HOME | End: 2020-06-25
Payer: COMMERCIAL

## 2020-06-25 DIAGNOSIS — E78.01 FAMILIAL HYPERCHOLESTEROLEMIA: ICD-10-CM

## 2020-06-25 DIAGNOSIS — I10 ESSENTIAL HYPERTENSION: ICD-10-CM

## 2020-06-25 LAB
ALBUMIN SERPL-MCNC: 4.4 G/DL (ref 3.4–5)
ALBUMIN/GLOB SERPL: 1.5 {RATIO} (ref 0.8–1.7)
ALP SERPL-CCNC: 62 U/L (ref 45–117)
ALT SERPL-CCNC: 45 U/L (ref 16–61)
ANION GAP SERPL CALC-SCNC: 5 MMOL/L (ref 3–18)
APPEARANCE UR: CLEAR
AST SERPL-CCNC: 20 U/L (ref 10–38)
BILIRUB SERPL-MCNC: 2 MG/DL (ref 0.2–1)
BILIRUB UR QL: NEGATIVE
BUN SERPL-MCNC: 22 MG/DL (ref 7–18)
BUN/CREAT SERPL: 22 (ref 12–20)
CALCIUM SERPL-MCNC: 9.1 MG/DL (ref 8.5–10.1)
CHLORIDE SERPL-SCNC: 107 MMOL/L (ref 100–111)
CHOLEST SERPL-MCNC: 139 MG/DL
CO2 SERPL-SCNC: 28 MMOL/L (ref 21–32)
COLOR UR: YELLOW
CREAT SERPL-MCNC: 1.01 MG/DL (ref 0.6–1.3)
GLOBULIN SER CALC-MCNC: 2.9 G/DL (ref 2–4)
GLUCOSE SERPL-MCNC: 93 MG/DL (ref 74–99)
GLUCOSE UR STRIP.AUTO-MCNC: NEGATIVE MG/DL
HDLC SERPL-MCNC: 39 MG/DL (ref 40–60)
HDLC SERPL: 3.6 {RATIO} (ref 0–5)
HGB UR QL STRIP: NEGATIVE
KETONES UR QL STRIP.AUTO: NEGATIVE MG/DL
LDLC SERPL CALC-MCNC: 77.4 MG/DL (ref 0–100)
LEUKOCYTE ESTERASE UR QL STRIP.AUTO: NEGATIVE
LIPID PROFILE,FLP: ABNORMAL
NITRITE UR QL STRIP.AUTO: NEGATIVE
PH UR STRIP: 5.5 [PH] (ref 5–8)
POTASSIUM SERPL-SCNC: 4.5 MMOL/L (ref 3.5–5.5)
PROT SERPL-MCNC: 7.3 G/DL (ref 6.4–8.2)
PROT UR STRIP-MCNC: NEGATIVE MG/DL
SODIUM SERPL-SCNC: 140 MMOL/L (ref 136–145)
SP GR UR REFRACTOMETRY: 1.02 (ref 1–1.03)
TRIGL SERPL-MCNC: 113 MG/DL (ref ?–150)
UROBILINOGEN UR QL STRIP.AUTO: 0.2 EU/DL (ref 0.2–1)
VLDLC SERPL CALC-MCNC: 22.6 MG/DL

## 2020-06-25 PROCEDURE — 80061 LIPID PANEL: CPT

## 2020-06-25 PROCEDURE — 81003 URINALYSIS AUTO W/O SCOPE: CPT

## 2020-06-25 PROCEDURE — 80053 COMPREHEN METABOLIC PANEL: CPT

## 2020-06-25 PROCEDURE — 36415 COLL VENOUS BLD VENIPUNCTURE: CPT

## 2020-07-29 ENCOUNTER — VIRTUAL VISIT (OUTPATIENT)
Dept: FAMILY MEDICINE CLINIC | Age: 60
End: 2020-07-29

## 2020-07-29 DIAGNOSIS — G44.52 NEW DAILY PERSISTENT HEADACHE: ICD-10-CM

## 2020-07-29 DIAGNOSIS — R42 DIZZINESS: ICD-10-CM

## 2020-07-29 DIAGNOSIS — R07.81 PLEURITIC CHEST PAIN: Primary | ICD-10-CM

## 2020-07-29 DIAGNOSIS — I10 ESSENTIAL HYPERTENSION: ICD-10-CM

## 2020-07-29 RX ORDER — NEBIVOLOL 5 MG/1
TABLET ORAL
Qty: 30 TAB | Refills: 1 | Status: SHIPPED | OUTPATIENT
Start: 2020-07-29 | End: 2020-10-15

## 2020-07-29 RX ORDER — ESOMEPRAZOLE MAGNESIUM 40 MG/1
CAPSULE, DELAYED RELEASE ORAL
Qty: 30 CAP | Refills: 0 | Status: SHIPPED | OUTPATIENT
Start: 2020-07-29 | End: 2021-01-15

## 2020-07-29 RX ORDER — METHYLCELLULOSE 500 MG/1
1000 TABLET ORAL
COMMUNITY

## 2020-07-29 RX ORDER — FAMOTIDINE 20 MG/1
20 TABLET, FILM COATED ORAL
COMMUNITY

## 2020-07-29 NOTE — PROGRESS NOTES
HISTORY OF PRESENT ILLNESS  Chika Maharaj is a 60 y.o. male. Presents today for follow-up on headache dizziness pleuritic chest pain and hypertension. Blood pressures remain well controlled. Is still complaining of some dizziness and  Headache. He also is complaining of pleuritic chest pain. Consent:  he and/or  healthcare decision maker is aware that this patient-initiated Telehealth encounter is a billable service, with coverage as determined by her insurance carrier. he is aware that she may receive a bill and has provided verbal consent to proceed: Yes    I was at home while conducting this encounter. Dizziness    The history is provided by the patient. This is a new problem. The problem has not changed since onset. Associated symptoms include dizziness. Pertinent negatives include no visual change, no congestion, no seizures and no slurred speech. He has tried nothing for the symptoms. His past medical history is significant for HTN. His past medical history does not include no CVA, no TIA or no DM. Hypertension    The history is provided by the patient. This is a chronic problem. The problem has been gradually improving. Associated symptoms include dizziness. Pertinent negatives include no anxiety, no blurred vision and no peripheral edema. There are no associated agents to hypertension. Risk factors include no risk factors. Headache   The history is provided by the patient. This is a new problem. The problem has not changed since onset. Nothing aggravates the symptoms. Nothing relieves the symptoms. He has tried nothing for the symptoms. Other   The history is provided by the patient (Patient is complaining of pleuritic chest pain which is now resolved. States that he has history of collapsed lung in the past). The problem has been resolved. Nothing aggravates the symptoms. Nothing relieves the symptoms. He has tried nothing for the symptoms.       Allergies   Allergen Reactions    Doxycycline Anaphylaxis    Keflex [Cephalexin] Anaphylaxis    Pcn [Penicillins] Hives     Current Outpatient Medications on File Prior to Visit   Medication Sig Dispense Refill    famotidine (Pepcid) 20 mg tablet Take 20 mg by mouth nightly.  methylcellulose (Citrucel) 500 mg tablet Take 1,000 mg by mouth nightly.  cyanocobalamin (Vitamin B-12) 100 mcg tablet Take 100 mcg by mouth daily.  pravastatin (PRAVACHOL) 20 mg tablet Take 1 Tab by mouth nightly. 90 Tab 1    losartan (COZAAR) 100 mg tablet Take 1 Tab by mouth daily. 90 Tab 1    tolterodine ER (DETROL LA) 4 mg ER capsule Take 1 Cap by mouth daily. 90 Cap 3    aspirin delayed-release 81 mg tablet Take 81 mg by mouth daily.  lysine (L-LYSINE) 500 mg tab tablet Take 500 mg by mouth daily.  multivitamin (MEN'S MULTI-VITAMIN) tablet Take 1 Tab by mouth daily.  [DISCONTINUED] nebivoloL (Bystolic) 5 mg tablet take 1 tablet by mouth once daily 30 Tab 1    [DISCONTINUED] esomeprazole (NEXIUM) 40 mg capsule take 1 capsule by mouth once daily 30 Cap 0    raNITIdine (ZANTAC) 150 mg tablet Take 150 mg by mouth nightly. No current facility-administered medications on file prior to visit.       Past Medical History:   Diagnosis Date    Chronic pain     Joint pain    GERD (gastroesophageal reflux disease)     Hypertension     IBS (irritable bowel syndrome)     Nocturia     OAB (overactive bladder)     Urinary frequency     Urinary urgency     Urine leukocytes      Past Surgical History:   Procedure Laterality Date    HX ACL RECONSTRUCTION Right     knee    HX ACL RECONSTRUCTION Bilateral     HX HEENT      cyst on jaw removed    HX ORTHOPAEDIC      HX OTHER SURGICAL Left     mass removed from left shoulder     HX OTHER SURGICAL      3 dental implants    HX UROLOGICAL      vasectomy     Family History   Problem Relation Age of Onset    Lupus Mother     Heart Disease Father     Hypertension Father     Cancer Father skin cancer    Cancer Brother         bone marrow; cll    Cancer Maternal Aunt         bone marrow; cll    Cancer Maternal Uncle         bone marrow; cll     Social History     Socioeconomic History    Marital status:      Spouse name: Not on file    Number of children: Not on file    Years of education: Not on file    Highest education level: Not on file   Occupational History    Not on file   Social Needs    Financial resource strain: Not on file    Food insecurity     Worry: Not on file     Inability: Not on file    Transportation needs     Medical: Not on file     Non-medical: Not on file   Tobacco Use    Smoking status: Never Smoker    Smokeless tobacco: Never Used   Substance and Sexual Activity    Alcohol use: Yes     Comment: occasionally    Drug use: No    Sexual activity: Yes     Partners: Female     Birth control/protection: None   Lifestyle    Physical activity     Days per week: Not on file     Minutes per session: Not on file    Stress: Not on file   Relationships    Social connections     Talks on phone: Not on file     Gets together: Not on file     Attends Oriental orthodox service: Not on file     Active member of club or organization: Not on file     Attends meetings of clubs or organizations: Not on file     Relationship status: Not on file    Intimate partner violence     Fear of current or ex partner: Not on file     Emotionally abused: Not on file     Physically abused: Not on file     Forced sexual activity: Not on file   Other Topics Concern    Not on file   Social History Narrative    Not on file         Review of Systems   Constitutional: Negative. HENT: Negative for congestion. Eyes: Negative. Negative for blurred vision. Respiratory: Negative. Cardiovascular: Negative. Musculoskeletal: Negative. Neurological: Positive for dizziness. Negative for seizures. Endo/Heme/Allergies: Negative. Psychiatric/Behavioral: Negative.       There were no vitals taken for this visit. Physical Exam  Nursing note reviewed. Constitutional:       Appearance: He is well-developed. HENT:      Head: Normocephalic and atraumatic. Pulmonary:      Effort: Pulmonary effort is normal. No respiratory distress. Musculoskeletal: Normal range of motion. General: No tenderness. Skin:     General: Skin is warm and dry. Coloration: Skin is not pale. Findings: No erythema or rash. Neurological:      Mental Status: He is alert and oriented to person, place, and time. Cranial Nerves: No cranial nerve deficit. Coordination: Coordination normal.   Psychiatric:         Behavior: Behavior normal.         Thought Content: Thought content normal.         ASSESSMENT and PLAN    ICD-10-CM ICD-9-CM    1. Pleuritic chest pain  R07.81 786.52 REFERRAL TO PULMONARY DISEASE   2. Dizziness  R42 780.4 REFERRAL TO NEUROLOGY   3. New daily persistent headache  G44.52 339.42 REFERRAL TO NEUROLOGY   4. Essential hypertension  I10 401.9    We discussed the expected course, resolution and complications of the diagnosis(es) in detail. Medication risks, benefits, costs, interactions, and alternatives were discussed as indicated. I advised her to contact the office if her condition worsens, changes or fails to improve as anticipated. She expressed understanding with the diagnosis(es) and plan. Pursuant to the emergency declaration under the [facility], [facility] waiver authority and the [employer] and [employer] Act, this Virtual  Visit was conducted, with patient's consent, to reduce the patient's risk of exposure to COVID-19 and provide continuity of care for an established patient. Services were provided through a video synchronous discussion virtually to substitute for in-person clinic visit.     Delmis Diallo MD      Follow-up and Dispositions    · Return in about 6 months (around 1/29/2021).

## 2020-07-29 NOTE — PROGRESS NOTES
1. Have you been to the ER, urgent care clinic since your last visit? Hospitalized since your last visit? No    2. Have you seen or consulted any other health care providers outside of the [address] [place] [address] since your last visit? Include any pap smears or colon screening. Yes Saw Dr. Jessica Rich for feet; knees and hands DX.  Osteoarthiritis

## 2020-08-07 ENCOUNTER — HOSPITAL ENCOUNTER (OUTPATIENT)
Dept: LAB | Age: 60
Discharge: HOME | End: 2020-08-07
Payer: COMMERCIAL

## 2020-08-07 DIAGNOSIS — R07.9 CHEST PAIN: ICD-10-CM

## 2020-08-07 LAB
ATRIAL RATE: 55 BPM
BASOPHILS # BLD: 0 K/UL (ref 0–0.1)
BASOPHILS NFR BLD: 0 % (ref 0–2)
CALCULATED P AXIS, ECG09: 21 DEGREES
CALCULATED R AXIS, ECG10: -10 DEGREES
CALCULATED T AXIS, ECG11: 32 DEGREES
DIAGNOSIS, 93000: NORMAL
DIFFERENTIAL METHOD BLD: ABNORMAL
EOSINOPHIL # BLD: 0.2 K/UL (ref 0–0.4)
EOSINOPHIL NFR BLD: 3 % (ref 0–5)
ERYTHROCYTE [DISTWIDTH] IN BLOOD BY AUTOMATED COUNT: 12.6 % (ref 11.6–14.5)
HCT VFR BLD AUTO: 46 % (ref 36–48)
HGB BLD-MCNC: 15.6 G/DL (ref 13–16)
LYMPHOCYTES # BLD: 1.8 K/UL (ref 0.9–3.6)
LYMPHOCYTES NFR BLD: 33 % (ref 21–52)
MCH RBC QN AUTO: 30.6 PG (ref 24–34)
MCHC RBC AUTO-ENTMCNC: 33.9 G/DL (ref 31–37)
MCV RBC AUTO: 90.2 FL (ref 74–97)
MONOCYTES # BLD: 0.7 K/UL (ref 0.05–1.2)
MONOCYTES NFR BLD: 13 % (ref 3–10)
NEUTS SEG # BLD: 2.9 K/UL (ref 1.8–8)
NEUTS SEG NFR BLD: 51 % (ref 40–73)
P-R INTERVAL, ECG05: 216 MS
PLATELET # BLD AUTO: 216 K/UL (ref 135–420)
PMV BLD AUTO: 11.4 FL (ref 9.2–11.8)
Q-T INTERVAL, ECG07: 416 MS
QRS DURATION, ECG06: 98 MS
QTC CALCULATION (BEZET), ECG08: 397 MS
RBC # BLD AUTO: 5.1 M/UL (ref 4.7–5.5)
VENTRICULAR RATE, ECG03: 55 BPM
WBC # BLD AUTO: 5.6 K/UL (ref 4.6–13.2)

## 2020-08-07 PROCEDURE — 86003 ALLG SPEC IGE CRUDE XTRC EA: CPT

## 2020-08-07 PROCEDURE — 36415 COLL VENOUS BLD VENIPUNCTURE: CPT

## 2020-08-07 PROCEDURE — 93005 ELECTROCARDIOGRAM TRACING: CPT

## 2020-08-07 PROCEDURE — 82785 ASSAY OF IGE: CPT

## 2020-08-07 PROCEDURE — 85025 COMPLETE CBC W/AUTO DIFF WBC: CPT

## 2020-08-09 LAB
A ALTERNATA IGE QN: <0.1 KU/L
A FUMIGATUS IGE QN: <0.1 KU/L
AMER ROACH IGE QN: <0.1 KU/L
AMER SYCAMORE IGE QN: <0.1 KU/L
BAHIA GRASS IGE QN: <0.1 KU/L
BERMUDA GRASS IGE QN: <0.1 KU/L
BOXELDER IGE QN: <0.1 KU/L
C HERBARUM IGE QN: <0.1 KU/L
CAT DANDER IGG QN: <0.1 KU/L
CLASS DESCRIPTION, 600268: NORMAL
COMMON RAGWEED IGE QN: <0.1 KU/L
D FARINAE IGE QN: <0.1 KU/L
D PTERONYSS IGE QN: <0.1 KU/L
DEPRECATED IGE QN: <0.1 KU/L
DOG DANDER IGE QN: <0.1 KU/L
ENGL PLANTAIN IGE QN: <0.1 KU/L
IGE SERPL-ACNC: 4 IU/ML (ref 6–495)
JOHNSON GRASS IGE QN: <0.1 KU/L
M RACEMOSUS IGE QN: <0.1 KU/L
MT JUNIPER IGE QN: <0.1 KU/L
MUGWORT IGE QN: <0.1 KU/L
NETTLE IGE QN: <0.1 KU/L
P NOTATUM IGE QN: <0.1 KU/L
S BOTRYOSUM IGE QN: <0.1 KU/L
SHEEP SORREL IGE QN: <0.1 KU/L
SWEET GUM IGE QN: <0.1 KU/L
TIMOTHY IGE QN: <0.1 KU/L
WHITE BIRCH IGE QN: <0.1 KU/L
WHITE ELM IGG QN: <0.1 KU/L
WHITE HICKORY IGE QN: <0.1 KU/L
WHITE MULBERRY IGE QN: <0.1 KU/L
WHITE OAK IGE QN: <0.1 KU/L

## 2020-08-10 ENCOUNTER — HOSPITAL ENCOUNTER (OUTPATIENT)
Dept: ULTRASOUND IMAGING | Age: 60
Discharge: HOME | End: 2020-08-10
Attending: INTERNAL MEDICINE
Payer: COMMERCIAL

## 2020-08-10 DIAGNOSIS — R94.5 NONSPECIFIC ABNORMAL RESULTS OF LIVER FUNCTION STUDY: ICD-10-CM

## 2020-08-10 PROCEDURE — 76705 ECHO EXAM OF ABDOMEN: CPT

## 2020-09-10 ENCOUNTER — HOSPITAL ENCOUNTER (OUTPATIENT)
Dept: LAB | Age: 60
Discharge: HOME | End: 2020-09-10
Payer: COMMERCIAL

## 2020-09-10 LAB
BASOPHILS # BLD: 0 K/UL (ref 0–0.1)
BASOPHILS NFR BLD: 0 % (ref 0–2)
DIFFERENTIAL METHOD BLD: NORMAL
EOSINOPHIL # BLD: 0.2 K/UL (ref 0–0.4)
EOSINOPHIL NFR BLD: 3 % (ref 0–5)
ERYTHROCYTE [DISTWIDTH] IN BLOOD BY AUTOMATED COUNT: 12.5 % (ref 11.6–14.5)
HCT VFR BLD AUTO: 44.7 % (ref 36–48)
HGB BLD-MCNC: 15.2 G/DL (ref 13–16)
LYMPHOCYTES # BLD: 2 K/UL (ref 0.9–3.6)
LYMPHOCYTES NFR BLD: 29 % (ref 21–52)
MCH RBC QN AUTO: 30.2 PG (ref 24–34)
MCHC RBC AUTO-ENTMCNC: 34 G/DL (ref 31–37)
MCV RBC AUTO: 88.7 FL (ref 74–97)
MONOCYTES # BLD: 0.5 K/UL (ref 0.05–1.2)
MONOCYTES NFR BLD: 8 % (ref 3–10)
NEUTS SEG # BLD: 4.1 K/UL (ref 1.8–8)
NEUTS SEG NFR BLD: 60 % (ref 40–73)
PLATELET # BLD AUTO: 282 K/UL (ref 135–420)
PMV BLD AUTO: 10.8 FL (ref 9.2–11.8)
RBC # BLD AUTO: 5.04 M/UL (ref 4.7–5.5)
WBC # BLD AUTO: 6.9 K/UL (ref 4.6–13.2)

## 2020-09-10 PROCEDURE — 36415 COLL VENOUS BLD VENIPUNCTURE: CPT

## 2020-09-10 PROCEDURE — 86003 ALLG SPEC IGE CRUDE XTRC EA: CPT

## 2020-09-10 PROCEDURE — 85025 COMPLETE CBC W/AUTO DIFF WBC: CPT

## 2020-09-10 PROCEDURE — 82785 ASSAY OF IGE: CPT

## 2020-11-24 RX ORDER — PRAVASTATIN SODIUM 20 MG/1
TABLET ORAL
Qty: 90 TAB | Refills: 1 | Status: SHIPPED | OUTPATIENT
Start: 2020-11-24

## 2020-12-30 ENCOUNTER — HOSPITAL ENCOUNTER (OUTPATIENT)
Dept: GENERAL RADIOLOGY | Age: 60
Discharge: HOME | End: 2020-12-30
Payer: COMMERCIAL

## 2020-12-30 ENCOUNTER — TRANSCRIBE ORDER (OUTPATIENT)
Dept: REGISTRATION | Age: 60
End: 2020-12-30

## 2020-12-30 DIAGNOSIS — J45.40 MODERATE PERSISTENT ASTHMA: ICD-10-CM

## 2020-12-30 DIAGNOSIS — J45.40 MODERATE PERSISTENT ASTHMA: Primary | ICD-10-CM

## 2020-12-30 PROCEDURE — 71046 X-RAY EXAM CHEST 2 VIEWS: CPT

## 2021-01-15 RX ORDER — ESOMEPRAZOLE MAGNESIUM 40 MG/1
CAPSULE, DELAYED RELEASE ORAL
Qty: 30 CAP | Refills: 0 | Status: SHIPPED | OUTPATIENT
Start: 2021-01-15 | End: 2021-02-22

## 2021-02-01 ENCOUNTER — VIRTUAL VISIT (OUTPATIENT)
Dept: FAMILY MEDICINE CLINIC | Age: 61
End: 2021-02-01
Payer: COMMERCIAL

## 2021-02-01 DIAGNOSIS — I10 ESSENTIAL HYPERTENSION: ICD-10-CM

## 2021-02-01 DIAGNOSIS — E78.5 HYPERLIPIDEMIA, UNSPECIFIED HYPERLIPIDEMIA TYPE: ICD-10-CM

## 2021-02-01 DIAGNOSIS — R42 DIZZINESS: ICD-10-CM

## 2021-02-01 DIAGNOSIS — B18.1 CHRONIC VIRAL HEPATITIS B WITHOUT DELTA AGENT AND WITHOUT COMA (HCC): Primary | ICD-10-CM

## 2021-02-01 PROCEDURE — 99214 OFFICE O/P EST MOD 30 MIN: CPT | Performed by: INTERNAL MEDICINE

## 2021-02-01 RX ORDER — MOMETASONE FUROATE 220 UG/1
INHALANT RESPIRATORY (INHALATION)
COMMUNITY
Start: 2020-10-29

## 2021-02-01 NOTE — PROGRESS NOTES
1. Have you been to the ER, urgent care clinic since your last visit? Hospitalized since your last visit? No    2. Have you seen or consulted any other health care providers outside of the [address] [place] [address] since your last visit? Include any pap smears or colon screening.  No    Chief Complaint   Patient presents with    Follow Up Chronic Condition    Hypertension     Home /85's

## 2021-02-01 NOTE — PROGRESS NOTES
HISTORY OF PRESENT ILLNESS  Darrian Jennings is a 63 y.o. male who presents for follow-up on hypertension and hyperlipidemia. Patient states that in the past he has been admitted for hepatitis B. He states that he continues to test positive for hepatitis B core antibody. He has seen both cardiology and neurology. He intermittently has a positive tilt table test.  He has been placed on compression hose with good relief of his symptoms of dizziness. Consent:  he and/or  healthcare decision maker is aware that this patient-initiated Telehealth encounter is a billable service, with coverage as determined by her insurance carrier. he is aware that she may receive a bill and has provided verbal consent to proceed: Yes    I was at home while conducting this encounter. .  Hypertension   The history is provided by the patient. This is a chronic problem. The problem has been gradually improving. Associated symptoms include dizziness. Pertinent negatives include no chest pain, no orthopnea, no palpitations, no anxiety, no blurred vision, no headaches, no peripheral edema and no shortness of breath. There are no associated agents to hypertension. Risk factors include no risk factors. Dizziness   The history is provided by the patient. This is a new problem. The problem has not changed since onset. Associated symptoms include dizziness. Pertinent negatives include no visual change, no chest pain, no palpitations, no abdominal pain, no congestion, no headaches, no seizures and no slurred speech. He has tried nothing for the symptoms. His past medical history is significant for HTN. His past medical history does not include no CVA, no TIA or no DM. Cholesterol Problem  The history is provided by the medical records. This is a chronic problem. The problem has been gradually improving. Pertinent negatives include no chest pain, no abdominal pain, no headaches and no shortness of breath.  The symptoms are aggravated by eating. The symptoms are relieved by medications. Treatments tried: Pravachol. The treatment provided significant relief. Allergies   Allergen Reactions    Doxycycline Anaphylaxis    Keflex [Cephalexin] Anaphylaxis    Pcn [Penicillins] Hives     Current Outpatient Medications on File Prior to Visit   Medication Sig Dispense Refill    Asmanex Twisthaler 220 mcg/ actuation (30) inhaler inhale 1 puff by mouth and INTO THE LUNGS twice a day      tolterodine ER (DETROL LA) 4 mg ER capsule Take 1 Cap by mouth daily. 30 Cap 1    esomeprazole (NEXIUM) 40 mg capsule take 1 capsule by mouth once daily 30 Cap 0    pravastatin (PRAVACHOL) 20 mg tablet take 1 tablet by mouth once daily at bedtime 90 Tab 1    nebivoloL (Bystolic) 5 mg tablet take 1 tablet by mouth once daily 30 Tab 5    famotidine (Pepcid) 20 mg tablet Take 20 mg by mouth nightly.  methylcellulose (Citrucel) 500 mg tablet Take 1,000 mg by mouth nightly.  cyanocobalamin (Vitamin B-12) 100 mcg tablet Take 100 mcg by mouth daily.  losartan (COZAAR) 100 mg tablet Take 1 Tab by mouth daily. 90 Tab 1    aspirin delayed-release 81 mg tablet Take 81 mg by mouth daily.  lysine (L-LYSINE) 500 mg tab tablet Take 500 mg by mouth daily.  multivitamin (MEN'S MULTI-VITAMIN) tablet Take 1 Tab by mouth daily. No current facility-administered medications on file prior to visit.       Past Medical History:   Diagnosis Date    Chronic pain     Joint pain    GERD (gastroesophageal reflux disease)     Hypertension     IBS (irritable bowel syndrome)     Nocturia     OAB (overactive bladder)     Urinary frequency     Urinary urgency     Urine leukocytes      Past Surgical History:   Procedure Laterality Date    HX ACL RECONSTRUCTION Right     knee    HX ACL RECONSTRUCTION Bilateral     HX HEENT      cyst on jaw removed    HX ORTHOPAEDIC      HX OTHER SURGICAL Left     mass removed from left shoulder     HX OTHER SURGICAL      3 dental implants    HX UROLOGICAL      vasectomy     Family History   Problem Relation Age of Onset    Lupus Mother     Heart Disease Father     Hypertension Father     Cancer Father         skin cancer    Cancer Brother         bone marrow; cll    Cancer Maternal Aunt         bone marrow; cll    Cancer Maternal Uncle         bone marrow; cll     Social History     Socioeconomic History    Marital status:      Spouse name: Not on file    Number of children: Not on file    Years of education: Not on file    Highest education level: Not on file   Occupational History    Not on file   Social Needs    Financial resource strain: Not on file    Food insecurity     Worry: Not on file     Inability: Not on file    Transportation needs     Medical: Not on file     Non-medical: Not on file   Tobacco Use    Smoking status: Never Smoker    Smokeless tobacco: Never Used   Substance and Sexual Activity    Alcohol use: Yes     Comment: occasionally    Drug use: No    Sexual activity: Yes     Partners: Female     Birth control/protection: None   Lifestyle    Physical activity     Days per week: Not on file     Minutes per session: Not on file    Stress: Not on file   Relationships    Social connections     Talks on phone: Not on file     Gets together: Not on file     Attends Nondenominational service: Not on file     Active member of club or organization: Not on file     Attends meetings of clubs or organizations: Not on file     Relationship status: Not on file    Intimate partner violence     Fear of current or ex partner: Not on file     Emotionally abused: Not on file     Physically abused: Not on file     Forced sexual activity: Not on file   Other Topics Concern    Not on file   Social History Narrative    Not on file       Review of Systems   Constitutional: Negative. HENT: Negative for congestion. Eyes: Negative. Negative for blurred vision. Respiratory: Negative.   Negative for shortness of breath. Cardiovascular: Negative. Negative for chest pain, palpitations and orthopnea. Gastrointestinal: Negative for abdominal pain. Musculoskeletal: Negative. Neurological: Positive for dizziness. Negative for seizures and headaches. Endo/Heme/Allergies: Negative. Psychiatric/Behavioral: Negative. There were no vitals taken for this visit. Physical Exam  Nursing note reviewed. Constitutional:       Appearance: He is well-developed. HENT:      Head: Normocephalic and atraumatic. Pulmonary:      Effort: Pulmonary effort is normal. No respiratory distress. Musculoskeletal: Normal range of motion. General: No tenderness. Skin:     General: Skin is warm and dry. Coloration: Skin is not pale. Findings: No erythema or rash. Neurological:      Mental Status: He is alert and oriented to person, place, and time. Cranial Nerves: No cranial nerve deficit. Coordination: Coordination normal.   Psychiatric:         Behavior: Behavior normal.         Thought Content: Thought content normal.         ASSESSMENT and PLAN    ICD-10-CM ICD-9-CM    1. Chronic viral hepatitis B without delta agent and without coma (HCC)  B18.1 070.32 HEPATITIS B CORE AB, TOTAL      METABOLIC PANEL, COMPREHENSIVE   2. Essential hypertension  Z38 843.0 METABOLIC PANEL, COMPREHENSIVE   3. Hyperlipidemia, unspecified hyperlipidemia type  A49.9 226.1 METABOLIC PANEL, COMPREHENSIVE      LIPID PANEL   4. Dizziness  R42 780.4    We discussed the expected course, resolution and complications of the diagnosis(es) in detail. Medication risks, benefits, costs, interactions, and alternatives were discussed as indicated. I advised her to contact the office if her condition worsens, changes or fails to improve as anticipated. She expressed understanding with the diagnosis(es) and plan.      Pursuant to the emergency declaration under the [facility] [place], [address] waiver authority and the [employer] Act, this Virtual  Visit was conducted, with patient's consent, to reduce the patient's risk of exposure to COVID-19 and provide continuity of care for an established patient. Services were provided through a video synchronous discussion virtually to substitute for in-person clinic visit. Yony Siddiqui MD      Follow-up and Dispositions    · Return in about 6 months (around 8/1/2021) for Make an appointment to have blood work done, Make appointment for vital signs check.

## 2021-02-04 ENCOUNTER — CLINICAL SUPPORT (OUTPATIENT)
Dept: FAMILY MEDICINE CLINIC | Age: 61
End: 2021-02-04

## 2021-02-04 ENCOUNTER — APPOINTMENT (OUTPATIENT)
Dept: FAMILY MEDICINE CLINIC | Age: 61
End: 2021-02-04

## 2021-02-04 ENCOUNTER — HOSPITAL ENCOUNTER (OUTPATIENT)
Dept: LAB | Age: 61
Discharge: HOME | End: 2021-02-04
Payer: COMMERCIAL

## 2021-02-04 VITALS
SYSTOLIC BLOOD PRESSURE: 110 MMHG | HEIGHT: 72 IN | OXYGEN SATURATION: 96 % | WEIGHT: 232.8 LBS | DIASTOLIC BLOOD PRESSURE: 72 MMHG | BODY MASS INDEX: 31.53 KG/M2 | RESPIRATION RATE: 17 BRPM | HEART RATE: 70 BPM

## 2021-02-04 DIAGNOSIS — E78.5 HYPERLIPIDEMIA, UNSPECIFIED HYPERLIPIDEMIA TYPE: ICD-10-CM

## 2021-02-04 DIAGNOSIS — B18.1 CHRONIC VIRAL HEPATITIS B WITHOUT DELTA AGENT AND WITHOUT COMA (HCC): ICD-10-CM

## 2021-02-04 DIAGNOSIS — I10 ESSENTIAL HYPERTENSION: ICD-10-CM

## 2021-02-04 DIAGNOSIS — I10 ESSENTIAL HYPERTENSION: Primary | ICD-10-CM

## 2021-02-04 LAB
ALBUMIN SERPL-MCNC: 4.1 G/DL (ref 3.4–5)
ALBUMIN/GLOB SERPL: 1.5 {RATIO} (ref 0.8–1.7)
ALP SERPL-CCNC: 68 U/L (ref 45–117)
ALT SERPL-CCNC: 69 U/L (ref 16–61)
ANION GAP SERPL CALC-SCNC: 7 MMOL/L (ref 3–18)
AST SERPL-CCNC: 24 U/L (ref 10–38)
BILIRUB SERPL-MCNC: 0.6 MG/DL (ref 0.2–1)
BUN SERPL-MCNC: 16 MG/DL (ref 7–18)
BUN/CREAT SERPL: 15 (ref 12–20)
CALCIUM SERPL-MCNC: 8.8 MG/DL (ref 8.5–10.1)
CHLORIDE SERPL-SCNC: 107 MMOL/L (ref 100–111)
CHOLEST SERPL-MCNC: 156 MG/DL
CO2 SERPL-SCNC: 29 MMOL/L (ref 21–32)
CREAT SERPL-MCNC: 1.09 MG/DL (ref 0.6–1.3)
GLOBULIN SER CALC-MCNC: 2.8 G/DL (ref 2–4)
GLUCOSE SERPL-MCNC: 100 MG/DL (ref 74–99)
HDLC SERPL-MCNC: 43 MG/DL (ref 40–60)
HDLC SERPL: 3.6 {RATIO} (ref 0–5)
LDLC SERPL CALC-MCNC: 86.6 MG/DL (ref 0–100)
LIPID PROFILE,FLP: NORMAL
POTASSIUM SERPL-SCNC: 4.5 MMOL/L (ref 3.5–5.5)
PROT SERPL-MCNC: 6.9 G/DL (ref 6.4–8.2)
SODIUM SERPL-SCNC: 143 MMOL/L (ref 136–145)
TRIGL SERPL-MCNC: 132 MG/DL (ref ?–150)
VLDLC SERPL CALC-MCNC: 26.4 MG/DL

## 2021-02-04 PROCEDURE — 80061 LIPID PANEL: CPT

## 2021-02-04 PROCEDURE — 36415 COLL VENOUS BLD VENIPUNCTURE: CPT

## 2021-02-04 PROCEDURE — 86704 HEP B CORE ANTIBODY TOTAL: CPT

## 2021-02-04 PROCEDURE — 80053 COMPREHEN METABOLIC PANEL: CPT

## 2021-02-04 NOTE — PROGRESS NOTES
Chief Complaint   Patient presents with    Blood Pressure Check     Patient presents today for Nurse Visit Vital sign check. Blood pressure taken manually in sitting position with feet flat on the floor. Patient has a follow up appointment scheduled with Dr. Daryle Deaner and aware that if she needs to make any changes we will call/see him sooner.

## 2021-02-06 LAB — HBV CORE AB SERPL QL IA: NEGATIVE

## 2021-03-02 RX ORDER — AMLODIPINE BESYLATE 5 MG/1
5 TABLET ORAL DAILY
Qty: 90 TAB | Refills: 0 | Status: SHIPPED | OUTPATIENT
Start: 2021-03-02

## 2021-03-02 RX ORDER — NEBIVOLOL 5 MG/1
TABLET ORAL
Qty: 30 TAB | Refills: 5 | Status: SHIPPED | OUTPATIENT
Start: 2021-03-02 | End: 2021-04-06 | Stop reason: DRUGHIGH

## 2021-03-02 NOTE — TELEPHONE ENCOUNTER
Patient is visiting Daughter and ran out of medication. At least 6 days is needed before the patient gets back to [place] . ...   Patient has not taken any meds for 2 days

## 2021-03-03 RX ORDER — LOSARTAN POTASSIUM 100 MG/1
100 TABLET ORAL DAILY
Qty: 90 TAB | Refills: 1 | Status: SHIPPED | OUTPATIENT
Start: 2021-03-03 | End: 2021-03-22

## 2021-03-03 NOTE — TELEPHONE ENCOUNTER
Sarah Garcia MD 13 hours ago (6:47 PM)        Norvasc was ordered for me and I take Losartan.   My wife requested Norvasc in error.     Thanks,     Ahmte Stewart

## 2021-07-06 PROBLEM — J45.909 ASTHMA: Status: ACTIVE | Noted: 2020-09-07

## 2021-07-06 PROBLEM — M19.042 ARTHRITIS OF FINGER OF BOTH HANDS: Status: ACTIVE | Noted: 2020-03-06

## 2021-07-06 PROBLEM — M19.041 ARTHRITIS OF FINGER OF BOTH HANDS: Status: ACTIVE | Noted: 2020-03-06

## 2021-07-06 PROBLEM — S40.012A CONTUSION OF LEFT SHOULDER: Status: ACTIVE | Noted: 2019-12-03

## 2022-03-18 PROBLEM — N32.81 OAB (OVERACTIVE BLADDER): Status: ACTIVE | Noted: 2020-01-02

## 2022-03-18 PROBLEM — N40.1 BPH WITH OBSTRUCTION/LOWER URINARY TRACT SYMPTOMS: Status: ACTIVE | Noted: 2020-01-02

## 2022-03-18 PROBLEM — R07.81 PLEURITIC CHEST PAIN: Status: ACTIVE | Noted: 2020-07-29

## 2022-03-18 PROBLEM — N13.8 BPH WITH OBSTRUCTION/LOWER URINARY TRACT SYMPTOMS: Status: ACTIVE | Noted: 2020-01-02

## 2022-03-19 PROBLEM — M19.042 ARTHRITIS OF FINGER OF BOTH HANDS: Status: ACTIVE | Noted: 2020-03-06

## 2022-03-19 PROBLEM — G44.52 NEW DAILY PERSISTENT HEADACHE: Status: ACTIVE | Noted: 2020-07-29

## 2022-03-19 PROBLEM — S40.012A CONTUSION OF LEFT SHOULDER: Status: ACTIVE | Noted: 2019-12-03

## 2022-03-19 PROBLEM — Z12.5 SCREENING FOR PROSTATE CANCER: Status: ACTIVE | Noted: 2019-03-04

## 2022-03-19 PROBLEM — E78.5 HYPERLIPIDEMIA: Status: ACTIVE | Noted: 2017-09-15

## 2022-03-19 PROBLEM — M19.041 ARTHRITIS OF FINGER OF BOTH HANDS: Status: ACTIVE | Noted: 2020-03-06

## 2022-03-19 PROBLEM — R42 DIZZINESS: Status: ACTIVE | Noted: 2020-07-29

## 2022-03-19 PROBLEM — I10 ESSENTIAL HYPERTENSION: Status: ACTIVE | Noted: 2017-09-15

## 2022-03-20 PROBLEM — K21.9 GASTROESOPHAGEAL REFLUX DISEASE WITHOUT ESOPHAGITIS: Status: ACTIVE | Noted: 2017-09-15

## 2022-03-20 PROBLEM — J45.909 ASTHMA: Status: ACTIVE | Noted: 2020-09-07

## 2022-03-20 PROBLEM — R39.15 URINARY URGENCY: Status: ACTIVE | Noted: 2020-01-02

## 2023-08-07 PROBLEM — S63.641A SPRAIN OF ULNAR COLLATERAL LIGAMENT OF METACARPOPHALANGEAL (MCP) JOINT OF RIGHT THUMB: Status: ACTIVE | Noted: 2019-11-04

## 2023-10-05 ENCOUNTER — HOSPITAL ENCOUNTER (OUTPATIENT)
Facility: HOSPITAL | Age: 63
Setting detail: RECURRING SERIES
Discharge: HOME | End: 2023-10-08
Payer: COMMERCIAL

## 2023-10-05 PROCEDURE — 97162 PT EVAL MOD COMPLEX 30 MIN: CPT

## 2023-10-05 PROCEDURE — 97535 SELF CARE MNGMENT TRAINING: CPT

## 2023-10-05 NOTE — THERAPY EVALUATION
[facility] JF:[phone] Fx: [phone]  Plan of Care / Statement of Necessity for Physical Therapy Services     Patient Name: Jorge Montemayor : 1960   Medical   Diagnosis:  Other low back pain [M54.59] Treatment Diagnosis: M54.59  OTHER LOWER BACK PAIN   Onset Date: 7-8 months      Referral Source: Helen Dixon MD Start of Care [facility]): 10/5/2023   Prior Hospitalization: See medical history Provider #: [id number]   Prior Level of Function: IND all ADLs without lower back pain    Comorbidities: Allergies, arthritis, asthma, back pain, BMI over 30, GI dz, hearing impairment, HTN, prior surgery, kidney, bladder, prostate, or urinary problems. Assessment / key information:  Jorge Montemayor is a 60 y.o. male who presents to skilled PT for the treatment diagnosis of low back pain, mostly on the R side near the flank and moving toward the spine. Pt states he worked for  [employer] when he was young and strained his lower back. About 4 years ago, also states having had back pain with some L sided sciatica as well. That has cleared up. This pain stated coming on gradually without particular triggers. Also notes some anterolateral pain as well. Worse with getting out of a chair. Feels the low back pain in a particular spot and then general pain along the flank. Has low grade pain with walking >3 miles. Avoids lifting due to fear of pain. Pt has been stretching to help the pain. Also working on chin tucks as well. Pt denies N/T down the R leg. Pt states has had PT for the lower back 4 years ago. When slouching the pain is worse. Pt states having had Covid twice and sees his pulmonologist every 3 months for check ups. Has CT scans once a years. Showed some nodules in the lungs, and the latest scan in August showed a hemangioma on the T11. Denies having had Xrays or MRI for the back.

## 2023-10-05 NOTE — PROGRESS NOTES
PHYSICAL / OCCUPATIONAL THERAPY - DAILY TREATMENT NOTE (updated )  For Eval visit    Patient Name: Michelle Hoyt    Date: 10/5/2023    : 1960  Insurance: Payor: Cici Thacker / Plan: Vicente Mcclellan / Product Type: *No Product type* /      Patient  verified yes     Visit #   Current / Total 1 12   Time   In / Out 8:23 9:03   Pain   In / Out 2/10 3/10   Subjective Functional Status/Changes: See POC     TREATMENT AREA =  Other low back pain [M54.59]    OBJECTIVE           30 min   Eval - untimed                      Therapeutic Procedures: Tx Min Billable or 1:1 Min (if diff from Tx Min) Procedure, Rationale, Specifics   10  41661 Self Care/Home Management (timed):  improve patient knowledge and understanding of pain reducing techniques, positioning, posture/ergonomics, home safety, activity modification, diagnosis/prognosis, and physical therapy expectations, procedures and progression  to improve patient's ability to progress to PLOF and address remaining functional goals. (see flow sheet as applicable)     Details if applicable:    Reviewed diagnosis, prognosis, therapy progression   Reviewed and educated on HEP           Details if applicable:      []   assessing strength/ROM  []   assessing activity performance (ex: sit to stand, stair negotiation)  []   assessing balance/control (ex. Jameson, DGI, SLS)          Details if applicable:      []   assessing strength/ROM  []   assessing activity performance (ex: sit to stand, stair negotiation)  []   assessing balance/control (ex. Jameson, DGI, SLS)          Details if applicable:      []   assessing strength/ROM  []   assessing activity performance (ex: sit to stand, stair negotiation)  []   assessing balance/control (ex.  Jameson, DGI, SLS)          Details if applicable:       10  [facility] Totals Reminder: bill using total billable min of TIMED therapeutic procedures (example: do not include dry needle or estim unattended, both untimed codes, in totals to

## 2023-10-09 ENCOUNTER — HOSPITAL ENCOUNTER (OUTPATIENT)
Facility: HOSPITAL | Age: 63
Setting detail: RECURRING SERIES
Discharge: HOME | End: 2023-10-12
Payer: COMMERCIAL

## 2023-10-09 PROCEDURE — 97140 MANUAL THERAPY 1/> REGIONS: CPT

## 2023-10-09 PROCEDURE — 97110 THERAPEUTIC EXERCISES: CPT

## 2023-10-09 NOTE — PROGRESS NOTES
PHYSICAL THERAPY - DAILY TREATMENT NOTE    Patient Name: Cary Fitch    Date: 10/9/2023    : 1960  Insurance: Payor: Irvin Cover / Plan: Caitlyn Picket / Product Type: *No Product type* /      Patient  verified YES   Visit #   Current / Total 2 12   Time   In / Out 8:20 9:00   Pain   In / Out 2 0   Subjective Functional Status/Changes: Says having sharp pain in the right, aggravated with sitting for a while then getting up. 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 TREATMENT AREA =  Other low back pain [M54.59]    OBJECTIVE        Therapeutic Procedures: Tx Min Billable or 1:1 Min (if diff from Tx Min) Procedure, Rationale, Specifics   15  23498 Therapeutic Exercise (timed):  increase ROM, strength, coordination, balance, and proprioception to improve patient's ability to progress to PLOF and address remaining functional goals. (see flow sheet as applicable)     Details if applicable:         97998 Neuromuscular Re-Education (timed):  improve balance, coordination, kinesthetic sense, posture, core stability and proprioception to improve patient's ability to develop conscious control of individual muscles and awareness of position of extremities in order to progress to PLOF and address remaining functional goals. (see flow sheet as applicable)     Details if applicable:     25  90922 Manual Therapy (timed):  decrease pain, increase ROM, increase tissue extensibility, decrease trigger points, and increase postural awareness to improve patient's ability to progress to PLOF and address remaining functional goals. The manual therapy interventions were performed at a separate and distinct time from the therapeutic activities interventions .  (see flow sheet as applicable)     Details if applicable:  Supine reclined: Dtm holds to upper right QL, longissimus, L5/S1          Details if applicable:            Details if applicable:     36  [facility] Totals Reminder: bill using total billable

## 2023-10-23 ENCOUNTER — TELEPHONE (OUTPATIENT)
Facility: HOSPITAL | Age: 63
End: 2023-10-23

## 2023-10-23 NOTE — TELEPHONE ENCOUNTER
Called pt in regards to NS 10/23. He stated he thought his appt today was at 8:40 and apologized for the mistake.  I reminded pt of his nextappt 10/26 @11am.

## 2023-10-26 ENCOUNTER — HOSPITAL ENCOUNTER (OUTPATIENT)
Facility: HOSPITAL | Age: 63
Setting detail: RECURRING SERIES
Discharge: HOME | End: 2023-10-29
Payer: COMMERCIAL

## 2023-10-26 PROCEDURE — 97110 THERAPEUTIC EXERCISES: CPT

## 2023-10-26 PROCEDURE — 97112 NEUROMUSCULAR REEDUCATION: CPT

## 2023-10-26 PROCEDURE — 97140 MANUAL THERAPY 1/> REGIONS: CPT

## 2023-10-26 NOTE — PROGRESS NOTES
PHYSICAL THERAPY - DAILY TREATMENT NOTE    Patient Name: Neris Reasoner    Date: 10/26/2023    : 1960  Insurance: Payor: Marlene Alarcon / Plan: Nisreen Power / Product Type: *No Product type* /      Patient  verified YES   Visit #   Current / Total 3 12   Time   In / Out 11:00 11:40   Pain   In / Out 2 1   Subjective Functional Status/Changes:   Says he was doing very well over the past 2 weeks until he came back and been sitting a lot and has a lot more work at [employer] and his MIL is just given bad diagnosis. Says the pain is along the lower right side. TREATMENT AREA =  Other low back pain [M54.59]    OBJECTIVE        Therapeutic Procedures: Tx Min Billable or 1:1 Min (if diff from Tx Min) Procedure, Rationale, Specifics   15  01683 Therapeutic Exercise (timed):  increase ROM, strength, coordination, balance, and proprioception to improve patient's ability to progress to PLOF and address remaining functional goals. (see flow sheet as applicable)     Details if applicable:       10  74485 Neuromuscular Re-Education (timed):  improve balance, coordination, kinesthetic sense, posture, core stability and proprioception to improve patient's ability to develop conscious control of individual muscles and awareness of position of extremities in order to progress to PLOF and address remaining functional goals. (see flow sheet as applicable)     Details if applicable:     15  33953 Manual Therapy (timed):  decrease pain, increase ROM, increase tissue extensibility, decrease trigger points, and increase postural awareness to improve patient's ability to progress to PLOF and address remaining functional goals. The manual therapy interventions were performed at a separate and distinct time from the therapeutic activities interventions .  (see flow sheet as applicable)     Details if applicable:  Supine reclined: Dtm holds to upper right QL, longissimus, L5/S1          Details if applicable:

## 2023-10-30 ENCOUNTER — HOSPITAL ENCOUNTER (OUTPATIENT)
Facility: HOSPITAL | Age: 63
Setting detail: RECURRING SERIES
Discharge: HOME | End: 2023-11-02
Payer: COMMERCIAL

## 2023-10-30 PROCEDURE — 97110 THERAPEUTIC EXERCISES: CPT

## 2023-10-30 PROCEDURE — 97112 NEUROMUSCULAR REEDUCATION: CPT

## 2023-10-30 PROCEDURE — 97140 MANUAL THERAPY 1/> REGIONS: CPT

## 2023-10-30 NOTE — THERAPY RECERTIFICATION
[facility] THERAPY  [address], [address] [facility] - Ph: [phone]  Fx: [phone]  PHYSICAL THERAPY PROGRESS NOTE  Patient Name: Toro Anne : 1960   Treatment/Medical Diagnosis: Other low back pain [M54.59] / M54.59  OTHER LOWER BACK PAIN    Referral Source: Darío Boykin MD     Date of Initial Visit: 10/5/23 Attended Visits: 4 Missed Visits: 2     SUMMARY OF TREATMENT  Pt seen in clinic for to address Other low back pain [M54.59]. Pt has been assessed, completed therapeutic exercises, neuromuscular re-ed, therapeutic functional activity, received manual therapy intervention, self-care strategies, HEP techniques and pt ed on condition consistency and follow-through. -    Subjective: Pt reports he is 15% of where he wants to be. Pt reports improvements in pain frequency and duration. Pt reports continued difficulties in sitting posture, and he can't ballroom dance. Says before the pain was there every day, now less frequency and duration. Still when it hurts the intensity is still up there. CURRENT STATUS  Pt seen infrequently (4 visits) over the past month to address back pain. Pt has made notable gains over course of care in reduced frequency and duration and moderate reduction in intensity at this time. Pt with noted hypomobility at the trunk globally and his condition improves with movement and is affected by long duration sitting at his job. Will continue POC as set in Eval.      GOALS:  Short Term Goals: To be accomplished in 4 weeks  1) Pt will be IND with HEP to facilitate self care management. EVAL: Provided HEP  Current Status: Compliant to date  Goal Met? MET/Ongoing  2) Pt will improve lumbar ROM to >75% all planes without incr in pain in order to improve ability to perform ADLs with improved ROM and less restriction.     EVAL: Flexion: 75% tight with pain coming back up   Extension: 40% incr R side low

## 2023-10-30 NOTE — PROGRESS NOTES
PHYSICAL THERAPY - DAILY TREATMENT NOTE    Patient Name: Mikhail Lynn    Date: 10/30/2023    : 1960  Insurance: Payor: Troy Barahona / Plan: Arcelia Da Silva / Product Type: *No Product type* /      Patient  verified YES   Visit #   Current / Total 4 12   Time   In / Out 7:40 8:20   Pain   In / Out 2 1   Subjective Functional Status/Changes:   Pt reports he is 15% of where he wants to be. Pt reports improvements in pain frequency and duration. Pt reports continued difficulties in sitting posture, and he can't ballroom dance. Says before the pain was there every day, now less frequency and duration. Still when it hurts the intensity is still up there. TREATMENT AREA =  Other low back pain [M54.59]    OBJECTIVE        Therapeutic Procedures: Tx Min Billable or 1:1 Min (if diff from Tx Min) Procedure, Rationale, Specifics   15  09319 Therapeutic Exercise (timed):  increase ROM, strength, coordination, balance, and proprioception to improve patient's ability to progress to PLOF and address remaining functional goals. (see flow sheet as applicable)     Details if applicable:       10  15682 Neuromuscular Re-Education (timed):  improve balance, coordination, kinesthetic sense, posture, core stability and proprioception to improve patient's ability to develop conscious control of individual muscles and awareness of position of extremities in order to progress to PLOF and address remaining functional goals. (see flow sheet as applicable)     Details if applicable:     15  39070 Manual Therapy (timed):  decrease pain, increase ROM, increase tissue extensibility, decrease trigger points, and increase postural awareness to improve patient's ability to progress to PLOF and address remaining functional goals. The manual therapy interventions were performed at a separate and distinct time from the therapeutic activities interventions .  (see flow sheet as applicable)     Details if applicable:  Prone: PA mobs to

## 2023-11-01 ENCOUNTER — APPOINTMENT (OUTPATIENT)
Facility: HOSPITAL | Age: 63
End: 2023-11-01
Payer: COMMERCIAL

## 2023-11-06 ENCOUNTER — HOSPITAL ENCOUNTER (OUTPATIENT)
Facility: HOSPITAL | Age: 63
Setting detail: RECURRING SERIES
Discharge: HOME | End: 2023-11-09
Payer: COMMERCIAL

## 2023-11-06 PROCEDURE — 97530 THERAPEUTIC ACTIVITIES: CPT

## 2023-11-06 PROCEDURE — 97110 THERAPEUTIC EXERCISES: CPT

## 2023-11-06 PROCEDURE — 97140 MANUAL THERAPY 1/> REGIONS: CPT

## 2023-11-06 NOTE — PROGRESS NOTES
needle or estim unattended, both untimed codes, in totals to left)  8-22 min = 1 unit; 23-37 min = 2 units; 38-52 min = 3 units; 53-67 min = 4 units; 68-82 min = 5 units   Total Total     [x]  Patient Education billed concurrently with other procedures   [x] Review HEP    [] Progressed/Changed HEP, detail:    [] Other detail:       Objective Information/Functional Measures/Assessment    Started with AROm and active dynamic warm-up and rotational stretches. Added strength drills with kettle bells w good result. Ended with MT. Patient will continue to benefit from skilled PT services to modify and progress therapeutic interventions, analyze and address functional mobility deficits, analyze and address ROM deficits, analyze and address strength deficits, analyze and address soft tissue restrictions, analyze and cue for proper movement patterns, and analyze and modify for postural abnormalities to address functional deficits and attain remaining goals. Progress toward goals / Updated goals:  []  See Progress Note/Recertification    Short Term Goals: To be accomplished in 4 weeks  1) Pt will be IND with HEP to facilitate self care management. EVAL: Provided HEP  Current Status: Compliant to date  Goal Met? MET/Ongoing  2) Pt will improve lumbar ROM to >75% all planes without incr in pain in order to improve ability to perform ADLs with improved ROM and less restriction. EVAL: Flexion: 75% tight with pain coming back up   Extension: 40% incr R side low back pain   L SB: 80%   R SB: 80%   L Rot: 90% incr R jordan pain     R Rot: 90% no p! Current Status: -Finger-tips to distal 1/3 tibia, Ext 20 deg, SB WNL finger-tips to lateral condyle bilaterally, rotation 60 deg bilaterally  Goal Met?  -MET  3) Pt will improve worst pain levels from initial evaluation level of 8-9/10 to 5/10 to show improved QOL and improved overall perception of pain-free/more pain-free function with ADLs.    EVAL: worst pain 8-9/10  Current

## 2023-11-13 ENCOUNTER — HOSPITAL ENCOUNTER (OUTPATIENT)
Facility: HOSPITAL | Age: 63
Setting detail: RECURRING SERIES
Discharge: HOME | End: 2023-11-16
Payer: COMMERCIAL

## 2023-11-13 PROCEDURE — 97110 THERAPEUTIC EXERCISES: CPT

## 2023-11-13 PROCEDURE — 97530 THERAPEUTIC ACTIVITIES: CPT

## 2023-11-13 NOTE — PROGRESS NOTES
pain levels despite increases in activity. EVAL:   Current Status: -  Goal Met?  -  2) Pt will improve FOTO scores to 64 in order to show detectable change in overall function.     EVAL: FOTO 55  Current Status: -  Goal Met?  -  3) Pt will be able to perform going from sitting to standing without incr in lower back pain  EVAL: painful going from sit to stand   Current Status: -  Goal Met?  -  4) Pt will be able to walk >3 miles without incr in lower back pain  EVAL: painful walking 3 miles  Current Status: -  Goal Met?  -    PLAN  YES Continue plan of care  [x]  Upgrade activities as tolerated  []  Discharge due to :  []  Other:    Claudine Goldberg, PT DPT, OCS   11/13/2023    6:46 AM    Future Appointments   Date Time Provider [address]   11/13/2023  7:40 AM Wanda Dee, PT [facility]   11/15/2023  7:40 AM Wanda Dee, PT [facility]   11/27/2023  7:40 AM Wanda Dee, PT [facility]   7/30/2024  8:20 AM [facility] OVI POD3 NURSE [facility]   8/7/2024  8:40 AM Hilda Gómez PA-C [facility]

## 2023-11-15 ENCOUNTER — APPOINTMENT (OUTPATIENT)
Facility: HOSPITAL | Age: 63
End: 2023-11-15
Payer: COMMERCIAL

## 2023-11-27 ENCOUNTER — HOSPITAL ENCOUNTER (OUTPATIENT)
Facility: HOSPITAL | Age: 63
Setting detail: RECURRING SERIES
Discharge: HOME | End: 2023-11-30
Payer: COMMERCIAL

## 2023-11-27 PROCEDURE — 97110 THERAPEUTIC EXERCISES: CPT

## 2023-11-27 PROCEDURE — 97535 SELF CARE MNGMENT TRAINING: CPT

## 2023-11-27 PROCEDURE — 97140 MANUAL THERAPY 1/> REGIONS: CPT

## 2023-11-27 PROCEDURE — 97530 THERAPEUTIC ACTIVITIES: CPT

## 2023-11-27 NOTE — THERAPY RECERTIFICATION
75% tight with pain coming back up   Extension: 40% incr R side low back pain   L SB: 80%   R SB: 80%   L Rot: 90% incr R jordan pain     R Rot: 90% no p! Current Status: -Finger-tips to distal 1/3 tibia, Ext 20 deg, SB WNL finger-tips to lateral condyle bilaterally, rotation 60 deg bilaterally  Goal Met?  -MET  3) Pt will improve worst pain levels from initial evaluation level of 8-9/10 to 5/10 to show improved QOL and improved overall perception of pain-free/more pain-free function with ADLs. EVAL: worst pain 8-9/10  Current Status: 2/10 at worst in the back  Goal Met?  -MET     Long Term Goals: To be accomplished in 6 weeks  1) Pt will maintain an average pain of 3/10 or less with all activities showing a progression in overall pain levels despite increases in activity. EVAL:   Current Status: -2/10 at worst in the back   Goal Met?  -MET  2) Pt will improve FOTO scores to 64 in order to show detectable change in overall function. EVAL: FOTO 55  Current Status: -76/100 (11/27/23)  Goal Met?  -MET  3) Pt will be able to perform going from sitting to standing without incr in lower back pain  EVAL: painful going from sit to stand   Current Status: -No change, yet complicated by left hip bursitis (11/27/23)  Goal Met?  -No  4) Pt will be able to walk >3 miles without incr in lower back pain  EVAL: painful walking 3 miles  Current Status: -Not met due to hip pain  Goal Met?  -No      RECOMMENDATIONS  Continue current progressive POC as above  -    Reporting Period (date from last assessment to current assessment): 10/30/23 - 11/27/23    If you have any questions/comments please contact us directly at [phone]. Thank you for allowing us to assist in the care of your patient.     Genoveva Rico, PT DPT, OCS      11/27/2023       6:54 AM

## 2023-11-27 NOTE — PROGRESS NOTES
PHYSICAL THERAPY - DAILY TREATMENT NOTE    Patient Name: Natty Sprain    Date: 2023    : 1960  Insurance: Payor: Nishi Paula / Plan: Orin Presley / Product Type: *No Product type* /      Patient  verified YES   Visit #   Current / Total 8 12   Time   In / Out 7:40 8:20   Pain   In / Out 2 0   Subjective Functional Status/Changes:   Pt reports improvements in back pain intensity and reduction in trunk stiffness and improved flexibility at the back  Pt reports continued difficulties in Left hip bursitis which is worse than the back pain. The hip pain has limited his ability to do his HEP. He is will his doctor in 2 . TREATMENT AREA =  Other low back pain [M54.59]    OBJECTIVE        Therapeutic Procedures: Tx Min Billable or 1:1 Min (if diff from Tx Min) Procedure, Rationale, Specifics   8  [id number] Therapeutic Exercise (timed):  increase ROM, strength, coordination, balance, and proprioception to improve patient's ability to progress to PLOF and address remaining functional goals. (see flow sheet as applicable)     Details if applicable:       13  40721 Therapeutic Activity (timed):  use of dynamic activities replicating functional movements to increase ROM, strength, coordination, balance, and proprioception in order to improve patient's ability to progress to PLOF and address remaining functional goals. (see flow sheet as applicable)     Details if applicable:     10  [id number] Manual Therapy (timed):  decrease pain, increase ROM, increase tissue extensibility, decrease trigger points, and increase postural awareness to improve patient's ability to progress to PLOF and address remaining functional goals. The manual therapy interventions were performed at a separate and distinct time from the therapeutic activities interventions . (see flow sheet as applicable)     Details if applicable:  . Left glute med and max stm in right SL.   9   14537 Self Care/Home Management (timed):  improve

## 2023-12-07 ENCOUNTER — HOSPITAL ENCOUNTER (OUTPATIENT)
Facility: HOSPITAL | Age: 63
Setting detail: RECURRING SERIES
Discharge: HOME | End: 2023-12-10
Payer: COMMERCIAL

## 2023-12-07 PROCEDURE — 97110 THERAPEUTIC EXERCISES: CPT

## 2023-12-07 PROCEDURE — 97530 THERAPEUTIC ACTIVITIES: CPT

## 2023-12-07 NOTE — PROGRESS NOTES
PHYSICAL THERAPY - DAILY TREATMENT NOTE    Patient Name: Sulema Bailon    Date: 2023    : 1960  Insurance: Payor: Leslie Graham / Plan: Gildardo Benedict / Product Type: *No Product type* /      Patient  verified YES   Visit #   Current / Total 9 12   Time   In / Out 12:20 1:00   Pain   In / Out 2 0   Subjective Functional Status/Changes:   Says had a bunch of stiffness last week. Used a bunch of Voltaren and tried to stretch it. Says the best is the wall forward str, doorway st and the rotation str. Will see his referring physician Unruly Infante Dr. Mostly the left hip. TREATMENT AREA =  Other low back pain [M54.59]    OBJECTIVE        Therapeutic Procedures: Tx Min Billable or 1:1 Min (if diff from Tx Min) Procedure, Rationale, Specifics   17  66007 Therapeutic Exercise (timed):  increase ROM, strength, coordination, balance, and proprioception to improve patient's ability to progress to PLOF and address remaining functional goals. (see flow sheet as applicable)     Details if applicable:       23  52170 Therapeutic Activity (timed):  use of dynamic activities replicating functional movements to increase ROM, strength, coordination, balance, and proprioception in order to improve patient's ability to progress to PLOF and address remaining functional goals. (see flow sheet as applicable)     Details if applicable:       [id number] Manual Therapy (timed):  decrease pain, increase ROM, increase tissue extensibility, decrease trigger points, and increase postural awareness to improve patient's ability to progress to PLOF and address remaining functional goals. The manual therapy interventions were performed at a separate and distinct time from the therapeutic activities interventions . (see flow sheet as applicable)     Details if applicable:  . Left glute med and max stm in right SL.   -   37654 Self Care/Home Management (timed):  improve patient knowledge and understanding of pain reducing techniques,

## 2024-01-17 ENCOUNTER — TELEPHONE (OUTPATIENT)
Facility: HOSPITAL | Age: 64
End: 2024-01-17

## 2024-01-17 NOTE — TELEPHONE ENCOUNTER
Called pt due to not seen since 12/7/23. He was sailing and reported he is doing good and doing his HEP.

## (undated) DEVICE — PREP SKN CHLRAPRP 26ML TNT -- CONVERT TO ITEM 373320

## (undated) DEVICE — STERILE POLYISOPRENE POWDER-FREE SURGICAL GLOVES: Brand: PROTEXIS

## (undated) DEVICE — DERMABOND SKIN ADH 0.7ML -- DERMABOND ADVANCED 12/BX

## (undated) DEVICE — LIGHT HANDLE: Brand: DEVON

## (undated) DEVICE — INSULATED BLADE ELECTRODE: Brand: EDGE

## (undated) DEVICE — SUTURE VCRL SZ 2-0 L27IN ABSRB UD L26MM SH 1/2 CIR J417H

## (undated) DEVICE — SUTURE VCRL SZ 3-0 L27IN ABSRB UD L26MM SH 1/2 CIR J416H

## (undated) DEVICE — DEPAUL MAJOR PROCEDURE PACK: Brand: MEDLINE INDUSTRIES, INC.

## (undated) DEVICE — KENDALL SCD EXPRESS SLEEVES, KNEE LENGTH, MEDIUM: Brand: KENDALL SCD

## (undated) DEVICE — SPONGE GZ W4XL4IN COT 12 PLY TYP VII WVN C FLD DSGN

## (undated) DEVICE — SOL IRR NACL 0.9% 500ML POUR --

## (undated) DEVICE — SUTURE MCRYL SZ 4-0 L18IN ABSRB UD L19MM PS-2 3/8 CIR PRIM Y496G